# Patient Record
Sex: FEMALE | Race: BLACK OR AFRICAN AMERICAN | Employment: FULL TIME | URBAN - METROPOLITAN AREA
[De-identification: names, ages, dates, MRNs, and addresses within clinical notes are randomized per-mention and may not be internally consistent; named-entity substitution may affect disease eponyms.]

---

## 2021-08-23 ENCOUNTER — CLINICAL SUPPORT (OUTPATIENT)
Dept: URGENT CARE | Facility: CLINIC | Age: 41
End: 2021-08-23
Payer: COMMERCIAL

## 2021-08-23 DIAGNOSIS — Z20.822 EXPOSURE TO COVID-19 VIRUS: Primary | ICD-10-CM

## 2021-08-23 LAB
CTP QC/QA: YES
SARS-COV-2 RDRP RESP QL NAA+PROBE: NEGATIVE

## 2021-08-23 PROCEDURE — U0002 COVID-19 LAB TEST NON-CDC: HCPCS | Mod: QW,S$GLB,, | Performed by: EMERGENCY MEDICINE

## 2021-08-23 PROCEDURE — U0002: ICD-10-PCS | Mod: QW,S$GLB,, | Performed by: EMERGENCY MEDICINE

## 2021-09-24 ENCOUNTER — CLINICAL SUPPORT (OUTPATIENT)
Dept: URGENT CARE | Facility: CLINIC | Age: 41
End: 2021-09-24
Payer: COMMERCIAL

## 2021-09-24 DIAGNOSIS — Z11.59 ENCOUNTER FOR SCREENING FOR OTHER VIRAL DISEASES: Primary | ICD-10-CM

## 2021-09-24 LAB
CTP QC/QA: YES
SARS-COV-2 RDRP RESP QL NAA+PROBE: NEGATIVE

## 2021-09-24 PROCEDURE — U0002 COVID-19 LAB TEST NON-CDC: HCPCS | Mod: QW,S$GLB,, | Performed by: FAMILY MEDICINE

## 2021-09-24 PROCEDURE — U0002: ICD-10-PCS | Mod: QW,S$GLB,, | Performed by: FAMILY MEDICINE

## 2021-09-24 PROCEDURE — 99211 OFF/OP EST MAY X REQ PHY/QHP: CPT | Mod: S$GLB,CS,, | Performed by: FAMILY MEDICINE

## 2021-09-24 PROCEDURE — 99211 PR OFFICE/OUTPT VISIT, EST, LEVL I: ICD-10-PCS | Mod: S$GLB,CS,, | Performed by: FAMILY MEDICINE

## 2021-10-18 ENCOUNTER — CLINICAL SUPPORT (OUTPATIENT)
Dept: URGENT CARE | Facility: CLINIC | Age: 41
End: 2021-10-18
Payer: COMMERCIAL

## 2021-10-18 DIAGNOSIS — Z20.822 EXPOSURE TO COVID-19 VIRUS: Primary | ICD-10-CM

## 2021-10-18 LAB
CTP QC/QA: YES
SARS-COV-2 RDRP RESP QL NAA+PROBE: NEGATIVE

## 2021-10-18 PROCEDURE — U0002: ICD-10-PCS | Mod: QW,S$GLB,, | Performed by: EMERGENCY MEDICINE

## 2021-10-18 PROCEDURE — U0002 COVID-19 LAB TEST NON-CDC: HCPCS | Mod: QW,S$GLB,, | Performed by: EMERGENCY MEDICINE

## 2021-10-25 ENCOUNTER — CLINICAL SUPPORT (OUTPATIENT)
Dept: URGENT CARE | Facility: CLINIC | Age: 41
End: 2021-10-25
Payer: COMMERCIAL

## 2021-10-25 DIAGNOSIS — Z11.52 ENCOUNTER FOR SCREENING FOR COVID-19: Primary | ICD-10-CM

## 2021-10-25 LAB
CTP QC/QA: YES
SARS-COV-2 RDRP RESP QL NAA+PROBE: NEGATIVE

## 2021-10-25 PROCEDURE — U0002 COVID-19 LAB TEST NON-CDC: HCPCS | Mod: QW,S$GLB,, | Performed by: FAMILY MEDICINE

## 2021-10-25 PROCEDURE — U0002: ICD-10-PCS | Mod: QW,S$GLB,, | Performed by: FAMILY MEDICINE

## 2021-11-10 ENCOUNTER — CLINICAL SUPPORT (OUTPATIENT)
Dept: URGENT CARE | Facility: CLINIC | Age: 41
End: 2021-11-10
Payer: COMMERCIAL

## 2021-11-10 DIAGNOSIS — Z13.9 ENCOUNTER FOR SCREENING: Primary | ICD-10-CM

## 2021-11-10 LAB
CTP QC/QA: YES
SARS-COV-2 RDRP RESP QL NAA+PROBE: NEGATIVE

## 2021-11-10 PROCEDURE — U0002 COVID-19 LAB TEST NON-CDC: HCPCS | Mod: QW,S$GLB,, | Performed by: NURSE PRACTITIONER

## 2021-11-10 PROCEDURE — U0002: ICD-10-PCS | Mod: QW,S$GLB,, | Performed by: NURSE PRACTITIONER

## 2021-11-29 ENCOUNTER — CLINICAL SUPPORT (OUTPATIENT)
Dept: URGENT CARE | Facility: CLINIC | Age: 41
End: 2021-11-29
Payer: COMMERCIAL

## 2021-11-29 DIAGNOSIS — Z11.52 ENCOUNTER FOR SCREENING FOR COVID-19: Primary | ICD-10-CM

## 2021-11-29 LAB
CTP QC/QA: YES
SARS-COV-2 RDRP RESP QL NAA+PROBE: NEGATIVE

## 2021-11-29 PROCEDURE — U0002: ICD-10-PCS | Mod: QW,S$GLB,, | Performed by: FAMILY MEDICINE

## 2021-11-29 PROCEDURE — U0002 COVID-19 LAB TEST NON-CDC: HCPCS | Mod: QW,S$GLB,, | Performed by: FAMILY MEDICINE

## 2021-12-06 ENCOUNTER — CLINICAL SUPPORT (OUTPATIENT)
Dept: URGENT CARE | Facility: CLINIC | Age: 41
End: 2021-12-06
Payer: COMMERCIAL

## 2021-12-06 DIAGNOSIS — Z11.52 ENCOUNTER FOR SCREENING FOR COVID-19: Primary | ICD-10-CM

## 2021-12-06 LAB
CTP QC/QA: YES
SARS-COV-2 RDRP RESP QL NAA+PROBE: NEGATIVE

## 2021-12-06 PROCEDURE — U0002: ICD-10-PCS | Mod: QW,S$GLB,, | Performed by: EMERGENCY MEDICINE

## 2021-12-06 PROCEDURE — U0002 COVID-19 LAB TEST NON-CDC: HCPCS | Mod: QW,S$GLB,, | Performed by: EMERGENCY MEDICINE

## 2021-12-16 ENCOUNTER — OFFICE VISIT (OUTPATIENT)
Dept: URGENT CARE | Facility: CLINIC | Age: 41
End: 2021-12-16
Payer: COMMERCIAL

## 2021-12-16 VITALS
SYSTOLIC BLOOD PRESSURE: 119 MMHG | WEIGHT: 200 LBS | DIASTOLIC BLOOD PRESSURE: 79 MMHG | TEMPERATURE: 97 F | HEIGHT: 62 IN | RESPIRATION RATE: 18 BRPM | OXYGEN SATURATION: 98 % | HEART RATE: 83 BPM | BODY MASS INDEX: 36.8 KG/M2

## 2021-12-16 DIAGNOSIS — J06.9 ACUTE URI: ICD-10-CM

## 2021-12-16 DIAGNOSIS — J30.9 ALLERGIC RHINITIS, UNSPECIFIED SEASONALITY, UNSPECIFIED TRIGGER: ICD-10-CM

## 2021-12-16 DIAGNOSIS — R05.9 COUGH: Primary | ICD-10-CM

## 2021-12-16 LAB
CTP QC/QA: YES
SARS-COV-2 RDRP RESP QL NAA+PROBE: NEGATIVE

## 2021-12-16 PROCEDURE — U0002 COVID-19 LAB TEST NON-CDC: HCPCS | Mod: QW,S$GLB,, | Performed by: FAMILY MEDICINE

## 2021-12-16 PROCEDURE — 99213 OFFICE O/P EST LOW 20 MIN: CPT | Mod: S$GLB,,, | Performed by: FAMILY MEDICINE

## 2021-12-16 PROCEDURE — U0002: ICD-10-PCS | Mod: QW,S$GLB,, | Performed by: FAMILY MEDICINE

## 2021-12-16 PROCEDURE — 99213 PR OFFICE/OUTPT VISIT, EST, LEVL III, 20-29 MIN: ICD-10-PCS | Mod: S$GLB,,, | Performed by: FAMILY MEDICINE

## 2021-12-16 RX ORDER — LORATADINE 10 MG/1
10 TABLET ORAL DAILY
Qty: 30 TABLET | Refills: 0 | Status: SHIPPED | OUTPATIENT
Start: 2021-12-16 | End: 2023-03-27

## 2021-12-23 ENCOUNTER — OFFICE VISIT (OUTPATIENT)
Dept: URGENT CARE | Facility: CLINIC | Age: 41
End: 2021-12-23
Payer: COMMERCIAL

## 2021-12-23 VITALS
WEIGHT: 200 LBS | TEMPERATURE: 99 F | HEIGHT: 62 IN | RESPIRATION RATE: 18 BRPM | DIASTOLIC BLOOD PRESSURE: 75 MMHG | OXYGEN SATURATION: 96 % | SYSTOLIC BLOOD PRESSURE: 114 MMHG | HEART RATE: 74 BPM | BODY MASS INDEX: 36.8 KG/M2

## 2021-12-23 DIAGNOSIS — U07.1 COVID-19: Primary | ICD-10-CM

## 2021-12-23 DIAGNOSIS — R07.89 CHEST WALL PAIN: ICD-10-CM

## 2021-12-23 DIAGNOSIS — R05.9 COUGH: ICD-10-CM

## 2021-12-23 LAB
CTP QC/QA: YES
SARS-COV-2 RDRP RESP QL NAA+PROBE: POSITIVE

## 2021-12-23 PROCEDURE — 1160F RVW MEDS BY RX/DR IN RCRD: CPT | Mod: CPTII,S$GLB,,

## 2021-12-23 PROCEDURE — 99213 PR OFFICE/OUTPT VISIT, EST, LEVL III, 20-29 MIN: ICD-10-PCS | Mod: S$GLB,,,

## 2021-12-23 PROCEDURE — 3008F BODY MASS INDEX DOCD: CPT | Mod: CPTII,S$GLB,,

## 2021-12-23 PROCEDURE — 99213 OFFICE O/P EST LOW 20 MIN: CPT | Mod: S$GLB,,,

## 2021-12-23 PROCEDURE — 3008F PR BODY MASS INDEX (BMI) DOCUMENTED: ICD-10-PCS | Mod: CPTII,S$GLB,,

## 2021-12-23 PROCEDURE — U0002 COVID-19 LAB TEST NON-CDC: HCPCS | Mod: QW,S$GLB,,

## 2021-12-23 PROCEDURE — 3078F DIAST BP <80 MM HG: CPT | Mod: CPTII,S$GLB,,

## 2021-12-23 PROCEDURE — 3074F PR MOST RECENT SYSTOLIC BLOOD PRESSURE < 130 MM HG: ICD-10-PCS | Mod: CPTII,S$GLB,,

## 2021-12-23 PROCEDURE — 1159F MED LIST DOCD IN RCRD: CPT | Mod: CPTII,S$GLB,,

## 2021-12-23 PROCEDURE — 1160F PR REVIEW ALL MEDS BY PRESCRIBER/CLIN PHARMACIST DOCUMENTED: ICD-10-PCS | Mod: CPTII,S$GLB,,

## 2021-12-23 PROCEDURE — 71046 X-RAY EXAM CHEST 2 VIEWS: CPT | Mod: S$GLB,,, | Performed by: RADIOLOGY

## 2021-12-23 PROCEDURE — 3078F PR MOST RECENT DIASTOLIC BLOOD PRESSURE < 80 MM HG: ICD-10-PCS | Mod: CPTII,S$GLB,,

## 2021-12-23 PROCEDURE — 71046 XR CHEST PA AND LATERAL: ICD-10-PCS | Mod: S$GLB,,, | Performed by: RADIOLOGY

## 2021-12-23 PROCEDURE — 1159F PR MEDICATION LIST DOCUMENTED IN MEDICAL RECORD: ICD-10-PCS | Mod: CPTII,S$GLB,,

## 2021-12-23 PROCEDURE — 3074F SYST BP LT 130 MM HG: CPT | Mod: CPTII,S$GLB,,

## 2021-12-23 PROCEDURE — U0002: ICD-10-PCS | Mod: QW,S$GLB,,

## 2021-12-23 RX ORDER — BENZONATATE 100 MG/1
100 CAPSULE ORAL 3 TIMES DAILY PRN
Qty: 30 CAPSULE | Refills: 0 | Status: SHIPPED | OUTPATIENT
Start: 2021-12-23 | End: 2022-01-02

## 2021-12-23 RX ORDER — NAPROXEN 500 MG/1
500 TABLET ORAL 2 TIMES DAILY
Qty: 10 TABLET | Refills: 0 | Status: SHIPPED | OUTPATIENT
Start: 2021-12-23 | End: 2021-12-28

## 2022-01-04 ENCOUNTER — NURSE TRIAGE (OUTPATIENT)
Dept: ADMINISTRATIVE | Facility: CLINIC | Age: 42
End: 2022-01-04
Payer: COMMERCIAL

## 2022-01-04 NOTE — TELEPHONE ENCOUNTER
Pt escalated in home monitoring queue. Pt states she initially was coughing up some blood tinged sputum but then twice she coughed up about a tablespoon of blood. Pt is also having some CP even without coughing. Denies SOB at rest. Advised pt she needs to go to ED now for evaluation. Pt verbalized understanding. Gave number to OOC for further concerns.    Reason for Disposition   Chest pain present when not coughing    Additional Information   Negative: Bluish (or gray) lips or face   Negative: SEVERE difficulty breathing (e.g., struggling for each breath, speaks in single words)   Negative: Rapid onset of cough and has hives   Negative: Coughing started suddenly after medicine, an allergic food or bee sting   Negative: Difficulty breathing after exposure to flames, smoke, or fumes   Negative: Sounds like a life-threatening emergency to the triager   Negative: MODERATE difficulty breathing (e.g., speaks in phrases, SOB even at rest, pulse 100-120) and still present when not coughing    Protocols used: COUGH-A-OH

## 2022-01-11 ENCOUNTER — HOSPITAL ENCOUNTER (OUTPATIENT)
Facility: HOSPITAL | Age: 42
Discharge: HOME | End: 2022-01-12
Attending: EMERGENCY MEDICINE | Admitting: HOSPITALIST
Payer: COMMERCIAL

## 2022-01-11 DIAGNOSIS — R07.9 LEFT-SIDED CHEST PAIN: ICD-10-CM

## 2022-01-11 DIAGNOSIS — I26.99 BILATERAL PULMONARY EMBOLISM: Primary | ICD-10-CM

## 2022-01-11 DIAGNOSIS — R06.02 SOB (SHORTNESS OF BREATH): ICD-10-CM

## 2022-01-11 DIAGNOSIS — K59.00 CONSTIPATION: ICD-10-CM

## 2022-01-11 DIAGNOSIS — R07.9 CHEST PAIN: ICD-10-CM

## 2022-01-11 PROBLEM — D72.829 LEUKOCYTOSIS: Status: ACTIVE | Noted: 2022-01-11

## 2022-01-11 PROBLEM — J18.9 MULTIFOCAL PNEUMONIA: Status: ACTIVE | Noted: 2022-01-11

## 2022-01-11 LAB
ALBUMIN SERPL BCP-MCNC: 3.9 G/DL (ref 3.5–5.2)
ALP SERPL-CCNC: 72 U/L (ref 55–135)
ALT SERPL W/O P-5'-P-CCNC: 16 U/L (ref 10–44)
ANION GAP SERPL CALC-SCNC: 11 MMOL/L (ref 8–16)
AST SERPL-CCNC: 12 U/L (ref 10–40)
B-HCG UR QL: NEGATIVE
BACTERIA #/AREA URNS HPF: NORMAL /HPF
BASOPHILS # BLD AUTO: 0.04 K/UL (ref 0–0.2)
BASOPHILS NFR BLD: 0.2 % (ref 0–1.9)
BILIRUB SERPL-MCNC: 1.1 MG/DL (ref 0.1–1)
BILIRUB UR QL STRIP: NEGATIVE
BUN SERPL-MCNC: 10 MG/DL (ref 6–20)
CALCIUM SERPL-MCNC: 9.3 MG/DL (ref 8.7–10.5)
CHLORIDE SERPL-SCNC: 101 MMOL/L (ref 95–110)
CLARITY UR: CLEAR
CO2 SERPL-SCNC: 24 MMOL/L (ref 23–29)
COLOR UR: YELLOW
CREAT SERPL-MCNC: 0.8 MG/DL (ref 0.5–1.4)
DIFFERENTIAL METHOD: ABNORMAL
EOSINOPHIL # BLD AUTO: 0 K/UL (ref 0–0.5)
EOSINOPHIL NFR BLD: 0.2 % (ref 0–8)
ERYTHROCYTE [DISTWIDTH] IN BLOOD BY AUTOMATED COUNT: 14.3 % (ref 11.5–14.5)
EST. GFR  (AFRICAN AMERICAN): >60 ML/MIN/1.73 M^2
EST. GFR  (NON AFRICAN AMERICAN): >60 ML/MIN/1.73 M^2
GLUCOSE SERPL-MCNC: 110 MG/DL (ref 70–110)
GLUCOSE UR QL STRIP: NEGATIVE
HCT VFR BLD AUTO: 38.4 % (ref 37–48.5)
HGB BLD-MCNC: 13.3 G/DL (ref 12–16)
HGB UR QL STRIP: ABNORMAL
HYALINE CASTS #/AREA URNS LPF: 1 /LPF
IMM GRANULOCYTES # BLD AUTO: 0.09 K/UL (ref 0–0.04)
IMM GRANULOCYTES NFR BLD AUTO: 0.5 % (ref 0–0.5)
KETONES UR QL STRIP: NEGATIVE
LEUKOCYTE ESTERASE UR QL STRIP: NEGATIVE
LYMPHOCYTES # BLD AUTO: 2.1 K/UL (ref 1–4.8)
LYMPHOCYTES NFR BLD: 12.4 % (ref 18–48)
MCH RBC QN AUTO: 27.4 PG (ref 27–31)
MCHC RBC AUTO-ENTMCNC: 34.6 G/DL (ref 32–36)
MCV RBC AUTO: 79 FL (ref 82–98)
MICROSCOPIC COMMENT: NORMAL
MONOCYTES # BLD AUTO: 1.7 K/UL (ref 0.3–1)
MONOCYTES NFR BLD: 10 % (ref 4–15)
NEUTROPHILS # BLD AUTO: 12.7 K/UL (ref 1.8–7.7)
NEUTROPHILS NFR BLD: 76.7 % (ref 38–73)
NITRITE UR QL STRIP: NEGATIVE
NRBC BLD-RTO: 0 /100 WBC
PH UR STRIP: 6 [PH] (ref 5–8)
PLATELET # BLD AUTO: 398 K/UL (ref 150–450)
PMV BLD AUTO: 10.3 FL (ref 9.2–12.9)
POTASSIUM SERPL-SCNC: 4.5 MMOL/L (ref 3.5–5.1)
PROT SERPL-MCNC: 7.9 G/DL (ref 6–8.4)
PROT UR QL STRIP: NEGATIVE
RBC # BLD AUTO: 4.86 M/UL (ref 4–5.4)
RBC #/AREA URNS HPF: 4 /HPF (ref 0–4)
SODIUM SERPL-SCNC: 136 MMOL/L (ref 136–145)
SP GR UR STRIP: 1.01 (ref 1–1.03)
SQUAMOUS #/AREA URNS HPF: 10 /HPF
TROPONIN I SERPL DL<=0.01 NG/ML-MCNC: <0.006 NG/ML (ref 0–0.03)
URN SPEC COLLECT METH UR: ABNORMAL
UROBILINOGEN UR STRIP-ACNC: NEGATIVE EU/DL
WBC # BLD AUTO: 16.56 K/UL (ref 3.9–12.7)
WBC #/AREA URNS HPF: 2 /HPF (ref 0–5)

## 2022-01-11 PROCEDURE — 96375 TX/PRO/DX INJ NEW DRUG ADDON: CPT

## 2022-01-11 PROCEDURE — 81025 URINE PREGNANCY TEST: CPT | Performed by: EMERGENCY MEDICINE

## 2022-01-11 PROCEDURE — 93010 ELECTROCARDIOGRAM REPORT: CPT | Mod: ,,, | Performed by: INTERNAL MEDICINE

## 2022-01-11 PROCEDURE — 93010 EKG 12-LEAD: ICD-10-PCS | Mod: ,,, | Performed by: INTERNAL MEDICINE

## 2022-01-11 PROCEDURE — 96365 THER/PROPH/DIAG IV INF INIT: CPT

## 2022-01-11 PROCEDURE — G0378 HOSPITAL OBSERVATION PER HR: HCPCS

## 2022-01-11 PROCEDURE — 96366 THER/PROPH/DIAG IV INF ADDON: CPT

## 2022-01-11 PROCEDURE — 80053 COMPREHEN METABOLIC PANEL: CPT | Performed by: NURSE PRACTITIONER

## 2022-01-11 PROCEDURE — 63600175 PHARM REV CODE 636 W HCPCS

## 2022-01-11 PROCEDURE — U0003 INFECTIOUS AGENT DETECTION BY NUCLEIC ACID (DNA OR RNA); SEVERE ACUTE RESPIRATORY SYNDROME CORONAVIRUS 2 (SARS-COV-2) (CORONAVIRUS DISEASE [COVID-19]), AMPLIFIED PROBE TECHNIQUE, MAKING USE OF HIGH THROUGHPUT TECHNOLOGIES AS DESCRIBED BY CMS-2020-01-R: HCPCS

## 2022-01-11 PROCEDURE — U0005 INFEC AGEN DETEC AMPLI PROBE: HCPCS

## 2022-01-11 PROCEDURE — 81000 URINALYSIS NONAUTO W/SCOPE: CPT | Performed by: EMERGENCY MEDICINE

## 2022-01-11 PROCEDURE — 84484 ASSAY OF TROPONIN QUANT: CPT | Performed by: NURSE PRACTITIONER

## 2022-01-11 PROCEDURE — 25000003 PHARM REV CODE 250

## 2022-01-11 PROCEDURE — 99285 EMERGENCY DEPT VISIT HI MDM: CPT | Mod: 25

## 2022-01-11 PROCEDURE — 63600175 PHARM REV CODE 636 W HCPCS: Performed by: NURSE PRACTITIONER

## 2022-01-11 PROCEDURE — 96372 THER/PROPH/DIAG INJ SC/IM: CPT | Mod: 59

## 2022-01-11 PROCEDURE — 93005 ELECTROCARDIOGRAM TRACING: CPT

## 2022-01-11 PROCEDURE — 25500020 PHARM REV CODE 255: Performed by: NURSE PRACTITIONER

## 2022-01-11 PROCEDURE — 93010 EKG 12-LEAD: ICD-10-PCS | Mod: 77,,, | Performed by: INTERNAL MEDICINE

## 2022-01-11 PROCEDURE — 93010 ELECTROCARDIOGRAM REPORT: CPT | Mod: 77,,, | Performed by: INTERNAL MEDICINE

## 2022-01-11 PROCEDURE — 85025 COMPLETE CBC W/AUTO DIFF WBC: CPT | Performed by: NURSE PRACTITIONER

## 2022-01-11 RX ORDER — IBUPROFEN 200 MG
24 TABLET ORAL
Status: DISCONTINUED | OUTPATIENT
Start: 2022-01-11 | End: 2022-01-12 | Stop reason: HOSPADM

## 2022-01-11 RX ORDER — IBUPROFEN 600 MG/1
600 TABLET ORAL EVERY 6 HOURS PRN
Status: DISCONTINUED | OUTPATIENT
Start: 2022-01-11 | End: 2022-01-12 | Stop reason: HOSPADM

## 2022-01-11 RX ORDER — TALC
6 POWDER (GRAM) TOPICAL NIGHTLY PRN
Status: DISCONTINUED | OUTPATIENT
Start: 2022-01-11 | End: 2022-01-12 | Stop reason: HOSPADM

## 2022-01-11 RX ORDER — ONDANSETRON 2 MG/ML
4 INJECTION INTRAMUSCULAR; INTRAVENOUS EVERY 8 HOURS PRN
Status: DISCONTINUED | OUTPATIENT
Start: 2022-01-11 | End: 2022-01-12 | Stop reason: HOSPADM

## 2022-01-11 RX ORDER — ACETAMINOPHEN 325 MG/1
650 TABLET ORAL EVERY 4 HOURS PRN
Status: DISCONTINUED | OUTPATIENT
Start: 2022-01-11 | End: 2022-01-12 | Stop reason: HOSPADM

## 2022-01-11 RX ORDER — PROCHLORPERAZINE EDISYLATE 5 MG/ML
5 INJECTION INTRAMUSCULAR; INTRAVENOUS EVERY 6 HOURS PRN
Status: DISCONTINUED | OUTPATIENT
Start: 2022-01-11 | End: 2022-01-12 | Stop reason: HOSPADM

## 2022-01-11 RX ORDER — BENZONATATE 100 MG/1
100 CAPSULE ORAL 3 TIMES DAILY PRN
Status: DISCONTINUED | OUTPATIENT
Start: 2022-01-11 | End: 2022-01-12 | Stop reason: HOSPADM

## 2022-01-11 RX ORDER — MAG HYDROX/ALUMINUM HYD/SIMETH 200-200-20
30 SUSPENSION, ORAL (FINAL DOSE FORM) ORAL 4 TIMES DAILY PRN
Status: DISCONTINUED | OUTPATIENT
Start: 2022-01-11 | End: 2022-01-12 | Stop reason: HOSPADM

## 2022-01-11 RX ORDER — LEVOFLOXACIN 5 MG/ML
750 INJECTION, SOLUTION INTRAVENOUS
Status: DISCONTINUED | OUTPATIENT
Start: 2022-01-11 | End: 2022-01-12

## 2022-01-11 RX ORDER — IBUPROFEN 200 MG
16 TABLET ORAL
Status: DISCONTINUED | OUTPATIENT
Start: 2022-01-11 | End: 2022-01-12 | Stop reason: HOSPADM

## 2022-01-11 RX ORDER — GLUCAGON 1 MG
1 KIT INJECTION
Status: DISCONTINUED | OUTPATIENT
Start: 2022-01-11 | End: 2022-01-12 | Stop reason: HOSPADM

## 2022-01-11 RX ORDER — KETOROLAC TROMETHAMINE 30 MG/ML
15 INJECTION, SOLUTION INTRAMUSCULAR; INTRAVENOUS
Status: COMPLETED | OUTPATIENT
Start: 2022-01-11 | End: 2022-01-11

## 2022-01-11 RX ORDER — POLYETHYLENE GLYCOL 3350 17 G/17G
17 POWDER, FOR SOLUTION ORAL DAILY
Status: DISCONTINUED | OUTPATIENT
Start: 2022-01-11 | End: 2022-01-12 | Stop reason: HOSPADM

## 2022-01-11 RX ORDER — DOCUSATE SODIUM 100 MG/1
100 CAPSULE, LIQUID FILLED ORAL 2 TIMES DAILY
Status: DISCONTINUED | OUTPATIENT
Start: 2022-01-11 | End: 2022-01-12 | Stop reason: HOSPADM

## 2022-01-11 RX ORDER — SODIUM CHLORIDE 0.9 % (FLUSH) 0.9 %
10 SYRINGE (ML) INJECTION EVERY 8 HOURS PRN
Status: DISCONTINUED | OUTPATIENT
Start: 2022-01-11 | End: 2022-01-12 | Stop reason: HOSPADM

## 2022-01-11 RX ORDER — ENOXAPARIN SODIUM 100 MG/ML
1 INJECTION SUBCUTANEOUS EVERY 12 HOURS
Status: DISCONTINUED | OUTPATIENT
Start: 2022-01-11 | End: 2022-01-12 | Stop reason: HOSPADM

## 2022-01-11 RX ORDER — SIMETHICONE 80 MG
1 TABLET,CHEWABLE ORAL 4 TIMES DAILY PRN
Status: DISCONTINUED | OUTPATIENT
Start: 2022-01-11 | End: 2022-01-12 | Stop reason: HOSPADM

## 2022-01-11 RX ADMIN — LEVOFLOXACIN 750 MG: 750 INJECTION, SOLUTION INTRAVENOUS at 08:01

## 2022-01-11 RX ADMIN — IOHEXOL 100 ML: 350 INJECTION, SOLUTION INTRAVENOUS at 02:01

## 2022-01-11 RX ADMIN — DOCUSATE SODIUM 100 MG: 100 CAPSULE, LIQUID FILLED ORAL at 08:01

## 2022-01-11 RX ADMIN — BENZONATATE 100 MG: 100 CAPSULE ORAL at 08:01

## 2022-01-11 RX ADMIN — POLYETHYLENE GLYCOL 3350 17 G: 17 POWDER, FOR SOLUTION ORAL at 09:01

## 2022-01-11 RX ADMIN — ENOXAPARIN SODIUM 90 MG: 100 INJECTION SUBCUTANEOUS at 08:01

## 2022-01-11 RX ADMIN — KETOROLAC TROMETHAMINE 15 MG: 30 INJECTION, SOLUTION INTRAMUSCULAR at 12:01

## 2022-01-11 RX ADMIN — IBUPROFEN 600 MG: 600 TABLET, FILM COATED ORAL at 08:01

## 2022-01-11 NOTE — HPI
Ms. Gayatri Henderson is a 40 y/o female with no known medical history presents to [facility] ED with complaints of chest pain that started last night around 2130 while at home lying on her couch. Patient reports her chest pain has been constant since last night. Patient reports her chest pain is located on her left side, constant, pressure-like, radiates to back, and 10/10 on numeric pain scale. Patient reports her chest pain is worse with coughing and movement. Patient denies taking any medication for her symptoms. She was recently diagnosed with COVID-19 infection on 12/23/21, which patient reports the symptoms had improved. She denies fever, fatigue, shortness of breath, hemoptysis, palpitations, nausea, vomiting, abdominal pain, or leg swelling. She reports she was recently seen by her PCP on 1/4/2022 and was prescribed azithromycin with Decadron x 5 days, which she has completed. She also reports her PCP recently informed her that her D-dimer is elevated (1.17). She denies history of DVTs or HRT. She denies recent travel. She tobacco or illicit drug use. She is not COVID-19 vaccinated. She also complains of constipation. [facility] chest revealed bilateral pulmonary emboli without evidence of right heart strain and numerous small nodular and ground-glass opacities in bilateral lungs; likely sequela of resolving multifocal pneumonia.

## 2022-01-11 NOTE — ED PROVIDER NOTES
Encounter Date: 1/11/2022       History     Chief Complaint   Patient presents with    Chest Pain     Pt c/o cp that began this am. Em reports that pt called 911 for flank pain and constipation.      40yo female presents to the ED for left sided chest pain that started last night at 2130 while lying on the couch.  The patient states that the pain has been constant since that time.  The pain is worse with movement and coughing.  Pt reports that she was diagnosed with COVID at the end of December and states that her symptoms have improved.  She denies fever, abd pain, vomiting, and hemoptysis.  She states that her PCP recently told her that her d-dimer is elevated.  No pmhx of DVT or HRT.  No recent travel.  The patient has tried nothing for her pain.  No pmhx of CAD.  She also reports constipation.  Her last BM was today although it was small.  Pt reports decreased oral intake.  No other complaints at this time.     The history is provided by the patient.     Review of patient's allergies indicates:  No Known Allergies  History reviewed. No pertinent past medical history.  History reviewed. No pertinent surgical history.  History reviewed. No pertinent family history.  Social History     Tobacco Use    Smoking status: Never Smoker    Smokeless tobacco: Never Used     Review of Systems   Constitutional: Positive for activity change and appetite change. Negative for chills, fatigue and fever.   HENT: Negative for ear pain, rhinorrhea and sore throat.    Respiratory: Positive for cough. Negative for shortness of breath.    Cardiovascular: Positive for chest pain. Negative for palpitations and leg swelling.   Gastrointestinal: Positive for constipation. Negative for abdominal pain, diarrhea, nausea and vomiting.   Skin: Negative for rash.   Neurological: Negative for weakness and headaches.   Psychiatric/Behavioral: Negative for confusion.   All other systems reviewed and are negative.      Physical Exam     Initial  Vitals [01/11/22 1023]   BP Pulse Resp Temp SpO2   110/71 81 18 98.2 °F (36.8 °C) 98 %      MAP       --         Physical Exam    Nursing note and vitals reviewed.  Constitutional: Vital signs are normal. She appears well-developed and well-nourished. She is Obese . She is active and cooperative.  Non-toxic appearance. She does not have a sickly appearance. She does not appear ill. No distress.   HENT:   Head: Normocephalic and atraumatic.   Eyes: Conjunctivae and EOM are normal.   Neck: Neck supple.   Normal range of motion.  Cardiovascular: Normal rate and regular rhythm.   Pulmonary/Chest: Effort normal and breath sounds normal. She exhibits tenderness. She exhibits no mass, no bony tenderness, no crepitus, no edema, no swelling and no retraction.     Abdominal: Abdomen is soft and flat. Normal appearance and bowel sounds are normal. There is no abdominal tenderness.   Musculoskeletal:      Cervical back: Normal range of motion and neck supple.      Comments: No LE edema or TTP.      Neurological: She is alert and oriented to person, place, and time. She has normal strength. GCS eye subscore is 4. GCS verbal subscore is 5. GCS motor subscore is 6.   Skin: Skin is warm, dry and intact. No bruising and no rash noted. No erythema. No pallor.   Psychiatric: She has a normal mood and affect. Her speech is normal and behavior is normal. Judgment and thought content normal. Cognition and memory are normal.         ED Course   Procedures  Labs Reviewed   URINALYSIS, REFLEX TO URINE CULTURE - Abnormal; Notable for the following components:       Result Value    Occult Blood UA 2+ (*)     All other components within normal limits    Narrative:     Specimen Source->Urine   CBC W/ AUTO DIFFERENTIAL - Abnormal; Notable for the following components:    WBC 16.56 (*)     MCV 79 (*)     Gran # (ANC) 12.7 (*)     Immature Grans (Abs) 0.09 (*)     Mono # 1.7 (*)     Gran % 76.7 (*)     Lymph % 12.4 (*)     All other components  within normal limits   COMPREHENSIVE METABOLIC PANEL - Abnormal; Notable for the following components:    Total Bilirubin 1.1 (*)     All other components within normal limits   PREGNANCY TEST, URINE RAPID    Narrative:     Specimen Source->Urine   URINALYSIS MICROSCOPIC    Narrative:     Specimen Source->Urine   TROPONIN I        ECG Results          EKG 12-lead (In process)  Result time 01/11/22 15:19:31    In process by Interface, Lab In [facility] (01/11/22 15:19:31)                 Narrative:    Test Reason : R07.9,    Vent. Rate : 084 BPM     Atrial Rate : 084 BPM     P-R Int : 138 ms          QRS Dur : 070 ms      QT Int : 370 ms       P-R-T Axes : 029 046 030 degrees     QTc Int : 437 ms    Normal sinus rhythm  Nonspecific T wave abnormality  Abnormal ECG  No previous ECGs available    Referred By: [email or web address]   SELF           Confirmed By:                             Imaging Results           CTA Chest Non-Coronary (PE Study) (Final result)  Result time 01/11/22 15:05:41    Final result by Huey Garcia DO (01/11/22 15:05:41)                 Impression:      1. Bilateral pulmonary emboli as detailed above.  No evidence of right heart strain.  2. Numerous small nodular and ground-glass opacities in the bilateral lungs, likely sequela of resolving multifocal pneumonia.  Follow-up is recommended at the resolution of the patient's acute symptoms.  This report was flagged in Epic as abnormal.    Dr. Garcia discussed critical findings with PRABHA Dobson by telephone at 15:05 on 01/11/2022.      Electronically signed by: Huey Garcia  Date:    01/11/2022  Time:    15:05             Narrative:    EXAMINATION:  CTA CHEST NON CORONARY    CLINICAL HISTORY:  Pulmonary embolism (PE) suspected, positive D-dimer;    TECHNIQUE:  Low dose axial images, sagittal and coronal reformations were obtained from the thoracic inlet to the lung bases following the IV administration of 100 mL of Omnipaque 350.  Contrast timing was  optimized to evaluate the pulmonary arteries.  Maximum intensity projection images were provided for review.    COMPARISON:  Chest radiograph from 12/23/2021.    FINDINGS:  Pulmonary vasculature: There are bilateral pulmonary emboli.  There is a occlusive thrombus within the left inter lobar pulmonary artery, extending into the lingular branch (series 2, image 138).  There are occlusive pulmonary emboli within left lower lobe segmental branches (series 2, image images 191, 163).  There is occlusive thrombus within the right inter lobar pulmonary artery, extending into a right upper lobe segmental branch (series 2, image 151).    Aorta: Left-sided aortic arch.  No aneurysm and no significant atherosclerosis.    Base of Neck: No significant abnormality.    Thoracic soft tissues: Normal.    Heart: Normal size. No effusion.    Maggie/Mediastinum: No pathologic dorcas enlargement.    Airways: The large airways are patent. No foci of endobronchial filling.    Lungs/Pleura: There are numerous small nodular and ground-glass opacities within the bilateral lungs.  No pleural effusion or thickening.    Esophagus: Normal.    Upper Abdomen: No abnormality of the partially imaged upper abdomen.    Bones: No acute fracture. No suspicious lytic or sclerotic lesions.                               X-Ray Abdomen Flat And Erect (Final result)  Result time 01/11/22 12:07:55    Final result by Emile Castellanos MD (01/11/22 12:07:55)                 Impression:      Nonobstructive bowel gas pattern.      Electronically signed by: Emile Castellanos MD  Date:    01/11/2022  Time:    12:07             Narrative:    EXAMINATION:  XR ABDOMEN FLAT AND ERECT    CLINICAL HISTORY:  Constipation, unspecified    TECHNIQUE:  Flat and erect AP views of the abdomen were performed.    COMPARISON:  Chest radiograph 12/23/2021    FINDINGS:  Mild amount of scattered fecal material throughout the colon.  Otherwise nonobstructive bowel gas pattern.  No organomegaly  or significant mass effect.  No large amount of free air or abnormal intra-abdominal calcification seen.  Imaged lung bases are clear.  Osseous structures appear intact.                                 Medications   enoxaparin injection 1 mg/kg (Dosing Weight) (has no administration in time range)   ketorolac injection 15 mg (15 mg Intravenous Given 1/11/22 1255)   iohexoL (OMNIPAQUE 350) injection 100 mL (100 mLs Intravenous Given 1/11/22 1437)     Medical Decision Making:   History:   Old Medical Records: I decided to obtain old medical records.  Old Records Summarized: other records.       <> Summary of Records: D-dimer 1/4/21 1.17.   Initial Assessment:   42yo female who was recently diagnosed with COVID presents to the ED for constant left-sided CP that started while at rest at 2130 last night. Pain has been constant and is worse with movement and cough.  Pt also reports constipation, last BM today. No vomiting. Pt is well-appearing.  Vitals stable.  Reproducible left lateral chest pain with palpation. No crepitus.   Differential Diagnosis:   Costochondritis, NSTEMI, arrhthymias, PE, pneumonia, pneumothorax, chest wall strain  Clinical Tests:   Lab Tests: Ordered and Reviewed  Radiological Study: Ordered and Reviewed  Medical Tests: Ordered and Reviewed  ED Management:  1320: Pt reports pain has improved after toradol.  VSS.     2:10 PM  Awaiting CTA.     Pt has bilateral PEs. No evidence of right heart strain.  Troponin negative.  I discussed the findings with , cardiology, who states that the PEs are small and in the segmental branches.  There will likely not be intervention. He advised lovenox and admit to .  He will review the CTA.     Discussed with [facility]  NP who agrees to accept the patient.  Pt is agreeable to the plan.   Other:   I have discussed this case with another health care provider.       <> Summary of the Discussion: Reviewed with  who agrees with ED course and  disposition.                       Clinical Impression:   Final diagnoses:  [R07.9] Chest pain  [K59.00] Constipation  [R07.9] Left-sided chest pain  [I26.99] Bilateral pulmonary embolism (Primary)          ED Disposition Condition    Observation               JAELYN Gamboa  01/11/22 1534

## 2022-01-11 NOTE — ASSESSMENT & PLAN NOTE
-recently tested positive for COVID-19 and was diagnosed with PNA by PCP on 1/4/2022  -Patient completed azithromycin and decadron x 5 day course and failed outpatient therapy  -Levoquin 750mg IV q24hr  -Supplemental Oxygen for SpO2 <92%  -anti-tussives for cough PRN

## 2022-01-11 NOTE — ASSESSMENT & PLAN NOTE
-Patient reported last BM was  -XRay of abdomen revealed mild amount of scattered fecal material throughout the colon and nonobstructive bowel gas pattern   -Will initiate bowel regimen  -Encourage fluid intake and OOB as tolerated

## 2022-01-11 NOTE — ASSESSMENT & PLAN NOTE
-WBC 16.56 on admit  -likely 2/2 to multi-focal PNA seen on CTA chest  -Initiated empiric antibiotic therapy with Levoquin 750mg IV q24hr  -Monitor and trend CBC daily  -Monitor and trend vital signs q4hr

## 2022-01-11 NOTE — ASSESSMENT & PLAN NOTE
Chest pain on exertion    -Presents with left chest pain worse with coughing and movement  -Initial troponin negative (<0.006)  -CTA chest revealed bilateral pulmonary emboli without evidence of right heart strain   -Started on Lovenox 1mg/kg q12hr  -Consulted Cardiology; appreciate recs; plan is to transition patient to OAC  -Continuous cardiac monitoring  -Anti-tussives for cough PRN  -Anagesics for pain management  -Supplemental oxygen for SpO2 <90%

## 2022-01-11 NOTE — Clinical Note
"Gayatri"Daphne Henderson was seen and treated in our emergency department on 1/11/2022.  She may return to work on 01/26/2022.  May return sooner if feeling better.     If you have any questions or concerns, please don't hesitate to call.      miladys breaux RN    "

## 2022-01-12 VITALS
DIASTOLIC BLOOD PRESSURE: 55 MMHG | OXYGEN SATURATION: 96 % | WEIGHT: 208 LBS | BODY MASS INDEX: 38.28 KG/M2 | HEART RATE: 95 BPM | TEMPERATURE: 98 F | SYSTOLIC BLOOD PRESSURE: 124 MMHG | RESPIRATION RATE: 42 BRPM | HEIGHT: 62 IN

## 2022-01-12 LAB
ALBUMIN SERPL BCP-MCNC: 3.3 G/DL (ref 3.5–5.2)
ALP SERPL-CCNC: 67 U/L (ref 55–135)
ALT SERPL W/O P-5'-P-CCNC: 11 U/L (ref 10–44)
ANION GAP SERPL CALC-SCNC: 9 MMOL/L (ref 8–16)
AORTIC ROOT ANNULUS: 2.58 CM
ASCENDING AORTA: 2.63 CM
AST SERPL-CCNC: 12 U/L (ref 10–40)
AV INDEX (PROSTH): 0.81
AV MEAN GRADIENT: 3 MMHG
AV PEAK GRADIENT: 4 MMHG
AV VALVE AREA: 2.44 CM2
AV VELOCITY RATIO: 0.77
BASOPHILS # BLD AUTO: 0.03 K/UL (ref 0–0.2)
BASOPHILS NFR BLD: 0.3 % (ref 0–1.9)
BILIRUB SERPL-MCNC: 1.2 MG/DL (ref 0.1–1)
BSA FOR ECHO PROCEDURE: 2.03 M2
BUN SERPL-MCNC: 15 MG/DL (ref 6–20)
CALCIUM SERPL-MCNC: 8.8 MG/DL (ref 8.7–10.5)
CHLORIDE SERPL-SCNC: 102 MMOL/L (ref 95–110)
CO2 SERPL-SCNC: 26 MMOL/L (ref 23–29)
CREAT SERPL-MCNC: 0.8 MG/DL (ref 0.5–1.4)
CV ECHO LV RWT: 0.42 CM
DIFFERENTIAL METHOD: ABNORMAL
DOP CALC AO PEAK VEL: 0.99 M/S
DOP CALC AO VTI: 18.88 CM
DOP CALC LVOT AREA: 3 CM2
DOP CALC LVOT DIAMETER: 1.96 CM
DOP CALC LVOT PEAK VEL: 0.76 M/S
DOP CALC LVOT STROKE VOLUME: 46.02 CM3
DOP CALCLVOT PEAK VEL VTI: 15.26 CM
E WAVE DECELERATION TIME: 165.02 MSEC
E/A RATIO: 1.85
E/E' RATIO: 9 M/S
ECHO LV POSTERIOR WALL: 0.78 CM (ref 0.6–1.1)
EJECTION FRACTION: 65 %
EOSINOPHIL # BLD AUTO: 0.2 K/UL (ref 0–0.5)
EOSINOPHIL NFR BLD: 1.5 % (ref 0–8)
ERYTHROCYTE [DISTWIDTH] IN BLOOD BY AUTOMATED COUNT: 14.6 % (ref 11.5–14.5)
EST. GFR  (AFRICAN AMERICAN): >60 ML/MIN/1.73 M^2
EST. GFR  (NON AFRICAN AMERICAN): >60 ML/MIN/1.73 M^2
FRACTIONAL SHORTENING: 35 % (ref 28–44)
GLUCOSE SERPL-MCNC: 92 MG/DL (ref 70–110)
HCT VFR BLD AUTO: 33.2 % (ref 37–48.5)
HGB BLD-MCNC: 11.3 G/DL (ref 12–16)
IMM GRANULOCYTES # BLD AUTO: 0.05 K/UL (ref 0–0.04)
IMM GRANULOCYTES NFR BLD AUTO: 0.4 % (ref 0–0.5)
INTERVENTRICULAR SEPTUM: 0.72 CM (ref 0.6–1.1)
IVRT: 122.74 MSEC
LA MAJOR: 4.52 CM
LA MINOR: 4.13 CM
LA WIDTH: 3.8 CM
LEFT ATRIUM SIZE: 3.23 CM
LEFT ATRIUM VOLUME INDEX MOD: 19.7 ML/M2
LEFT ATRIUM VOLUME INDEX: 23.2 ML/M2
LEFT ATRIUM VOLUME MOD: 38.24 CM3
LEFT ATRIUM VOLUME: 45.03 CM3
LEFT INTERNAL DIMENSION IN SYSTOLE: 2.41 CM (ref 2.1–4)
LEFT VENTRICLE DIASTOLIC VOLUME INDEX: 29.69 ML/M2
LEFT VENTRICLE DIASTOLIC VOLUME: 57.59 ML
LEFT VENTRICLE MASS INDEX: 39 G/M2
LEFT VENTRICLE SYSTOLIC VOLUME INDEX: 10.5 ML/M2
LEFT VENTRICLE SYSTOLIC VOLUME: 20.33 ML
LEFT VENTRICULAR INTERNAL DIMENSION IN DIASTOLE: 3.69 CM (ref 3.5–6)
LEFT VENTRICULAR MASS: 75.11 G
LV LATERAL E/E' RATIO: 9 M/S
LV SEPTAL E/E' RATIO: 9 M/S
LYMPHOCYTES # BLD AUTO: 3.2 K/UL (ref 1–4.8)
LYMPHOCYTES NFR BLD: 28.2 % (ref 18–48)
MAGNESIUM SERPL-MCNC: 1.8 MG/DL (ref 1.6–2.6)
MCH RBC QN AUTO: 27.1 PG (ref 27–31)
MCHC RBC AUTO-ENTMCNC: 34 G/DL (ref 32–36)
MCV RBC AUTO: 80 FL (ref 82–98)
MONOCYTES # BLD AUTO: 1.4 K/UL (ref 0.3–1)
MONOCYTES NFR BLD: 12.8 % (ref 4–15)
MV PEAK A VEL: 0.39 M/S
MV PEAK E VEL: 0.72 M/S
MV STENOSIS PRESSURE HALF TIME: 47.86 MS
MV VALVE AREA P 1/2 METHOD: 4.6 CM2
NEUTROPHILS # BLD AUTO: 6.4 K/UL (ref 1.8–7.7)
NEUTROPHILS NFR BLD: 56.8 % (ref 38–73)
NRBC BLD-RTO: 0 /100 WBC
PHOSPHATE SERPL-MCNC: 3.9 MG/DL (ref 2.7–4.5)
PLATELET # BLD AUTO: 346 K/UL (ref 150–450)
PMV BLD AUTO: 10.6 FL (ref 9.2–12.9)
POTASSIUM SERPL-SCNC: 3.9 MMOL/L (ref 3.5–5.1)
PROT SERPL-MCNC: 7 G/DL (ref 6–8.4)
PULM VEIN S/D RATIO: 1.13
PV PEAK D VEL: 0.24 M/S
PV PEAK S VEL: 0.27 M/S
RA MAJOR: 3.69 CM
RA PRESSURE: 3 MMHG
RA WIDTH: 2.82 CM
RBC # BLD AUTO: 4.17 M/UL (ref 4–5.4)
RIGHT VENTRICULAR END-DIASTOLIC DIMENSION: 2.36 CM
RV TISSUE DOPPLER FREE WALL SYSTOLIC VELOCITY 1 (APICAL 4 CHAMBER VIEW): 9.22 CM/S
SARS-COV-2 RNA RESP QL NAA+PROBE: DETECTED
SARS-COV-2- CYCLE NUMBER: 39
SODIUM SERPL-SCNC: 137 MMOL/L (ref 136–145)
STJ: 2.58 CM
TDI LATERAL: 0.08 M/S
TDI SEPTAL: 0.08 M/S
TDI: 0.08 M/S
WBC # BLD AUTO: 11.27 K/UL (ref 3.9–12.7)

## 2022-01-12 PROCEDURE — 63600175 PHARM REV CODE 636 W HCPCS: Performed by: NURSE PRACTITIONER

## 2022-01-12 PROCEDURE — G0378 HOSPITAL OBSERVATION PER HR: HCPCS

## 2022-01-12 PROCEDURE — 83735 ASSAY OF MAGNESIUM: CPT

## 2022-01-12 PROCEDURE — 85025 COMPLETE CBC W/AUTO DIFF WBC: CPT

## 2022-01-12 PROCEDURE — 99220 PR INITIAL OBSERVATION CARE,LEVL III: CPT | Mod: ,,, | Performed by: NURSE PRACTITIONER

## 2022-01-12 PROCEDURE — 96372 THER/PROPH/DIAG INJ SC/IM: CPT | Mod: 59

## 2022-01-12 PROCEDURE — 84100 ASSAY OF PHOSPHORUS: CPT

## 2022-01-12 PROCEDURE — 25000003 PHARM REV CODE 250

## 2022-01-12 PROCEDURE — 96376 TX/PRO/DX INJ SAME DRUG ADON: CPT

## 2022-01-12 PROCEDURE — 99220 PR INITIAL OBSERVATION CARE,LEVL III: ICD-10-PCS | Mod: ,,, | Performed by: NURSE PRACTITIONER

## 2022-01-12 PROCEDURE — 80053 COMPREHEN METABOLIC PANEL: CPT

## 2022-01-12 RX ORDER — KETOROLAC TROMETHAMINE 30 MG/ML
30 INJECTION, SOLUTION INTRAMUSCULAR; INTRAVENOUS ONCE
Status: COMPLETED | OUTPATIENT
Start: 2022-01-12 | End: 2022-01-12

## 2022-01-12 RX ADMIN — ENOXAPARIN SODIUM 90 MG: 100 INJECTION SUBCUTANEOUS at 11:01

## 2022-01-12 RX ADMIN — KETOROLAC TROMETHAMINE 30 MG: 30 INJECTION, SOLUTION INTRAMUSCULAR; INTRAVENOUS at 03:01

## 2022-01-12 RX ADMIN — BENZONATATE 100 MG: 100 CAPSULE ORAL at 11:01

## 2022-01-12 RX ADMIN — ACETAMINOPHEN 650 MG: 325 TABLET ORAL at 11:01

## 2022-01-12 RX ADMIN — IBUPROFEN 600 MG: 600 TABLET, FILM COATED ORAL at 11:01

## 2022-01-12 RX ADMIN — POLYETHYLENE GLYCOL 3350 17 G: 17 POWDER, FOR SOLUTION ORAL at 11:01

## 2022-01-12 NOTE — ASSESSMENT & PLAN NOTE
- presented with chest pain and SOB with elevated D dimer and recent COVID infection  - CTA with bilateral PEs; BLE venous ultrasound with no evidence of DVT  - placed on SQ Lovenox; Cardiology consulted  - BNP normal and troponin negative and on room air; CT scan reviewed per Dr. Forman  - recommend Lovenox with transition to DOAC with either Xarelto or Eliquis- no need for thrombectomy

## 2022-01-12 NOTE — H&P
[facility] Medicine  History & Physical    Patient Name: Gayatri Henderson  MRN: [id number]  Patient Class: OP- Observation  Admission Date: 1/11/2022  Attending Physician: Roland Andino, *   Primary Care Provider: Primary Doctor No         Patient information was obtained from patient and ER records.     Subjective:     Principal Problem:Bilateral pulmonary embolism    Chief Complaint:   Chief Complaint   Patient presents with    Chest Pain     Pt c/o cp that began this am. Em reports that pt called 911 for flank pain and constipation.         HPI: Ms. Gayatri Henderson is a 42 y/o female with no known medical history presents to [facility] ED with complaints of chest pain that started last night around 2130 while at home lying on her couch. Patient reports her chest pain has been constant since last night. Patient reports her chest pain is located on her left side, constant, pressure-like, radiates to back, and 10/10 on numeric pain scale. Patient reports her chest pain is worse with coughing and movement. Patient denies taking any medication for her symptoms. She was recently diagnosed with COVID-19 infection on 12/23/21, which patient reports the symptoms had improved. She denies fever, fatigue, shortness of breath, hemoptysis, palpitations, nausea, vomiting, abdominal pain, or leg swelling. She reports she was recently seen by her PCP on 1/4/2022 and was prescribed azithromycin with Decadron x 5 days, which she has completed. She also reports her PCP recently informed her that her D-dimer is elevated (1.17). She denies history of DVTs or HRT. She denies recent travel. She tobacco or illicit drug use. She is not COVID-19 vaccinated. She also complains of constipation. [facility]: CTA chest revealed bilateral pulmonary emboli without evidence of right heart strain and numerous small nodular and ground-glass opacities in bilateral lungs; likely sequela of  resolving multifocal pneumonia.       History reviewed. No pertinent past medical history.    History reviewed. No pertinent surgical history.    Review of patient's allergies indicates:  No Known Allergies    No current facility-administered medications on file prior to encounter.     Current Outpatient Medications on File Prior to Encounter   Medication Sig    loratadine (CLARITIN) 10 mg tablet Take 1 tablet (10 mg total) by mouth once daily.     Family History    None       Tobacco Use    Smoking status: Never Smoker    Smokeless tobacco: Never Used   Substance and Sexual Activity    Alcohol use: Not on file    Drug use: Not on file    Sexual activity: Not on file     Review of Systems   Constitutional: Negative for chills, diaphoresis and fever.   HENT: Negative for hearing loss and sore throat.    Eyes: Negative for visual disturbance.   Respiratory: Positive for cough. Negative for shortness of breath and wheezing.    Cardiovascular: Positive for chest pain. Negative for leg swelling.   Gastrointestinal: Negative for abdominal pain, diarrhea, nausea and vomiting.   Genitourinary: Negative for difficulty urinating, dysuria and flank pain.   Musculoskeletal: Positive for back pain. Negative for neck pain.   Skin: Negative for rash and wound.   Neurological: Negative for dizziness, weakness and headaches.   Psychiatric/Behavioral: Negative for agitation, confusion and hallucinations.     Objective:     Vital Signs (Most Recent):  Temp: 98.3 °F (36.8 °C) (01/11/22 1648)  Pulse: 91 (01/11/22 1648)  Resp: (!) 23 (01/11/22 1648)  BP: 122/75 (01/11/22 1648)  SpO2: 100 % (01/11/22 1648) Vital Signs (24h Range):  Temp:  [98.2 °F (36.8 °C)-98.3 °F (36.8 °C)] 98.3 °F (36.8 °C)  Pulse:  [81-91] 91  Resp:  [18-27] 23  SpO2:  [98 %-100 %] 100 %  BP: (110-150)/(66-75) 122/75     Weight: 94.3 kg (208 lb)  Body mass index is 38.04 kg/m².    Physical Exam  Vitals and nursing note reviewed.   Constitutional:       General:  She is not in acute distress.     Appearance: Normal appearance. She is not ill-appearing.   HENT:      Head: Normocephalic and atraumatic.      Mouth/Throat:      Mouth: Mucous membranes are moist.   Eyes:      Extraocular Movements: Extraocular movements intact.      Pupils: Pupils are equal, round, and reactive to light.   Cardiovascular:      Rate and Rhythm: Normal rate and regular rhythm.      Pulses: Normal pulses.      Heart sounds: Normal heart sounds. No murmur heard.  No friction rub. No gallop.    Pulmonary:      Effort: Pulmonary effort is normal. No respiratory distress.      Breath sounds: Examination of the right-middle field reveals rhonchi. Examination of the left-middle field reveals rhonchi. Examination of the right-lower field reveals decreased breath sounds. Examination of the left-lower field reveals decreased breath sounds. Decreased breath sounds and rhonchi present. No wheezing.   Abdominal:      General: Bowel sounds are normal. There is no distension.      Palpations: Abdomen is soft.      Tenderness: There is no abdominal tenderness. There is no guarding.   Musculoskeletal:         General: No swelling.      Cervical back: Normal range of motion and neck supple.      Right lower leg: No edema.      Left lower leg: No edema.   Skin:     General: Skin is warm and dry.      Capillary Refill: Capillary refill takes less than 2 seconds.      Findings: No bruising, erythema or rash.   Neurological:      General: No focal deficit present.      Mental Status: She is alert and oriented to person, place, and time.   Psychiatric:         Mood and Affect: Mood normal.         Behavior: Behavior normal. Behavior is cooperative.           CRANIAL NERVES     CN III, IV, VI   Pupils are equal, round, and reactive to light.       Significant Labs:   All pertinent labs within the past 24 hours have been reviewed.  Bilirubin:   Recent Labs   Lab 01/11/22  1153   BILITOT 1.1*     BMP:   Recent Labs   Lab  01/11/22  1153         K 4.5      CO2 24   BUN 10   CREATININE 0.8   CALCIUM 9.3     CBC:   Recent Labs   Lab 01/11/22  1153   WBC 16.56*   HGB 13.3   HCT 38.4        CMP:   Recent Labs   Lab 01/11/22  1153      K 4.5      CO2 24      BUN 10   CREATININE 0.8   CALCIUM 9.3   PROT 7.9   ALBUMIN 3.9   BILITOT 1.1*   ALKPHOS 72   AST 12   ALT 16   ANIONGAP 11   EGFRNONAA >60         Troponin:   Recent Labs   Lab 01/11/22  1153   TROPONINI <0.006     Urine Studies:   Recent Labs   Lab 01/11/22  1039   COLORU Yellow   APPEARANCEUA Clear   PHUR 6.0   SPECGRAV 1.015   PROTEINUA Negative   GLUCUA Negative   KETONESU Negative   BILIRUBINUA Negative   OCCULTUA 2+*   NITRITE Negative   UROBILINOGEN Negative   LEUKOCYTESUR Negative   RBCUA 4   WBCUA 2   BACTERIA Occasional   SQUAMEPITHEL 10   HYALINECASTS 1       Significant Imaging: I have reviewed all pertinent imaging results/findings within the past 24 hours.    Assessment/Plan:     * Bilateral pulmonary embolism  Chest pain on exertion    -Presents with left chest pain worse with coughing and movement  -Initial troponin negative (<0.006)  -CTA chest revealed bilateral pulmonary emboli without evidence of right heart strain   -Started on Lovenox 1mg/kg q12hr  -Consulted Cardiology; appreciate recs; plan is to transition patient to OAC  -Continuous cardiac monitoring  -Anti-tussives for cough PRN  -Anagesics for pain management  -Supplemental oxygen for SpO2 <90%      Leukocytosis  -WBC 16.56 on admit  -likely 2/2 to multi-focal PNA seen on CTA chest  -Initiated empiric antibiotic therapy with Levoquin 750mg IV q24hr  -Monitor and trend CBC daily  -Monitor and trend vital signs q4hr      Multifocal pneumonia  -recently tested positive for COVID-19 and was diagnosed with PNA by PCP on 1/4/2022  -Patient completed azithromycin and decadron x 5 day course and failed outpatient therapy  -Levoquin 750mg IV q24hr  -Supplemental Oxygen  for SpO2 <92%  -anti-tussives for cough PRN        Constipation  -Patient reported last BM was  -XRay of abdomen revealed mild amount of scattered fecal material throughout the colon and nonobstructive bowel gas pattern   -Will initiate bowel regimen  -Encourage fluid intake and OOB as tolerated      Chest pain on exertion  See above.     VTE Risk Mitigation (From admission, onward)         Ordered     enoxaparin injection 90 mg  Every 12 hours         01/11/22 4622                     eMrcedes Cosby, ZANE, ACNPC-AG, CCRN  Hospitalist  Department of Hospital Medicine  [facility]   [phone]

## 2022-01-12 NOTE — SUBJECTIVE & OBJECTIVE
Interval History: She feels better. On room air. Still has some SOB and chest pain on exertion. BLE negative for DVT.      Review of Systems   Constitutional: Negative for appetite change, chills and fever.   HENT: Negative for congestion.    Eyes: Negative for visual disturbance.   Respiratory: Positive for shortness of breath (on exertion).    Cardiovascular: Positive for chest pain (with deep breath). Negative for leg swelling.   Gastrointestinal: Negative for abdominal pain and nausea.   Genitourinary: Negative for difficulty urinating and dysuria.   Musculoskeletal: Negative for arthralgias.   Skin: Negative for wound.   Neurological: Negative for dizziness, weakness and headaches.   Psychiatric/Behavioral: Negative for agitation.     Objective:     Vital Signs (Most Recent):  Temp: 98.3 °F (36.8 °C) (01/11/22 1648)  Pulse: 86 (01/12/22 0847)  Resp: (!) 23 (01/12/22 0847)  BP: 102/66 (01/12/22 0802)  SpO2: 96 % (01/12/22 0847) Vital Signs (24h Range):  Temp:  [98.2 °F (36.8 °C)-98.3 °F (36.8 °C)] 98.3 °F (36.8 °C)  Pulse:  [] 86  Resp:  [12-29] 23  SpO2:  [95 %-100 %] 96 %  BP: (102-150)/(51-75) 102/66     Weight: 94.3 kg (208 lb)  Body mass index is 38.04 kg/m².    Intake/Output Summary (Last 24 hours) at 1/12/2022 1020  Last data filed at 1/11/2022 2230  Gross per 24 hour   Intake 150 ml   Output --   Net 150 ml      Physical Exam  Vitals and nursing note reviewed.   Constitutional:       General: She is not in acute distress.  HENT:      Head: Normocephalic and atraumatic.      Nose: Nose normal.      Mouth/Throat:      Mouth: Mucous membranes are moist.   Eyes:      Pupils: Pupils are equal, round, and reactive to light.   Cardiovascular:      Rate and Rhythm: Normal rate and regular rhythm.      Pulses: Normal pulses.      Heart sounds: Normal heart sounds.   Pulmonary:      Effort: Pulmonary effort is normal. No respiratory distress.      Comments: diminished throughout, poor inspiratory  effort  Abdominal:      General: Bowel sounds are normal.      Palpations: Abdomen is soft.   Musculoskeletal:         General: No swelling. Normal range of motion.      Cervical back: Normal range of motion.   Skin:     General: Skin is warm and dry.   Neurological:      Mental Status: She is alert and oriented to person, place, and time.   Psychiatric:         Behavior: Behavior normal.         Thought Content: Thought content normal.         Significant Labs: All pertinent labs within the past 24 hours have been reviewed.    Significant Imaging: I have reviewed all pertinent imaging results/findings within the past 24 hours.

## 2022-01-12 NOTE — SUBJECTIVE & OBJECTIVE
History reviewed. No pertinent past medical history.    History reviewed. No pertinent surgical history.    Review of patient's allergies indicates:  No Known Allergies    No current facility-administered medications on file prior to encounter.     Current Outpatient Medications on File Prior to Encounter   Medication Sig    loratadine (CLARITIN) 10 mg tablet Take 1 tablet (10 mg total) by mouth once daily.     Family History    None       Tobacco Use    Smoking status: Never Smoker    Smokeless tobacco: Never Used   Substance and Sexual Activity    Alcohol use: Not on file    Drug use: Not on file    Sexual activity: Not on file     Review of Systems   Constitutional: Negative for chills, diaphoresis and fever.   HENT: Negative for hearing loss and sore throat.    Eyes: Negative for visual disturbance.   Respiratory: Positive for cough. Negative for shortness of breath and wheezing.    Cardiovascular: Positive for chest pain. Negative for leg swelling.   Gastrointestinal: Negative for abdominal pain, diarrhea, nausea and vomiting.   Genitourinary: Negative for difficulty urinating, dysuria and flank pain.   Musculoskeletal: Positive for back pain. Negative for neck pain.   Skin: Negative for rash and wound.   Neurological: Negative for dizziness, weakness and headaches.   Psychiatric/Behavioral: Negative for agitation, confusion and hallucinations.     Objective:     Vital Signs (Most Recent):  Temp: 98.3 °F (36.8 °C) (01/11/22 1648)  Pulse: 91 (01/11/22 1648)  Resp: (!) 23 (01/11/22 1648)  BP: 122/75 (01/11/22 1648)  SpO2: 100 % (01/11/22 1648) Vital Signs (24h Range):  Temp:  [98.2 °F (36.8 °C)-98.3 °F (36.8 °C)] 98.3 °F (36.8 °C)  Pulse:  [81-91] 91  Resp:  [18-27] 23  SpO2:  [98 %-100 %] 100 %  BP: (110-150)/(66-75) 122/75     Weight: 94.3 kg (208 lb)  Body mass index is 38.04 kg/m².    Physical Exam  Vitals and nursing note reviewed.   Constitutional:       General: She is not in acute distress.      Appearance: Normal appearance. She is not ill-appearing.   HENT:      Head: Normocephalic and atraumatic.      Mouth/Throat:      Mouth: Mucous membranes are moist.   Eyes:      Extraocular Movements: Extraocular movements intact.      Pupils: Pupils are equal, round, and reactive to light.   Cardiovascular:      Rate and Rhythm: Normal rate and regular rhythm.      Pulses: Normal pulses.      Heart sounds: Normal heart sounds. No murmur heard.  No friction rub. No gallop.    Pulmonary:      Effort: Pulmonary effort is normal. No respiratory distress.      Breath sounds: Examination of the right-middle field reveals rhonchi. Examination of the left-middle field reveals rhonchi. Examination of the right-lower field reveals decreased breath sounds. Examination of the left-lower field reveals decreased breath sounds. Decreased breath sounds and rhonchi present. No wheezing.   Abdominal:      General: Bowel sounds are normal. There is no distension.      Palpations: Abdomen is soft.      Tenderness: There is no abdominal tenderness. There is no guarding.   Musculoskeletal:         General: No swelling.      Cervical back: Normal range of motion and neck supple.      Right lower leg: No edema.      Left lower leg: No edema.   Skin:     General: Skin is warm and dry.      Capillary Refill: Capillary refill takes less than 2 seconds.      Findings: No bruising, erythema or rash.   Neurological:      General: No focal deficit present.      Mental Status: She is alert and oriented to person, place, and time.   Psychiatric:         Mood and Affect: Mood normal.         Behavior: Behavior normal. Behavior is cooperative.           CRANIAL NERVES     CN III, IV, VI   Pupils are equal, round, and reactive to light.       Significant Labs:   All pertinent labs within the past 24 hours have been reviewed.  Bilirubin:   Recent Labs   Lab 01/11/22  1153   BILITOT 1.1*     BMP:   Recent Labs   Lab 01/11/22  1153          K 4.5      CO2 24   BUN 10   CREATININE 0.8   CALCIUM 9.3     CBC:   Recent Labs   Lab 01/11/22  1153   WBC 16.56*   HGB 13.3   HCT 38.4        CMP:   Recent Labs   Lab 01/11/22  1153      K 4.5      CO2 24      BUN 10   CREATININE 0.8   CALCIUM 9.3   PROT 7.9   ALBUMIN 3.9   BILITOT 1.1*   ALKPHOS 72   AST 12   ALT 16   ANIONGAP 11   EGFRNONAA >60         Troponin:   Recent Labs   Lab 01/11/22  1153   TROPONINI <0.006     Urine Studies:   Recent Labs   Lab 01/11/22  1039   COLORU Yellow   APPEARANCEUA Clear   PHUR 6.0   SPECGRAV 1.015   PROTEINUA Negative   GLUCUA Negative   KETONESU Negative   BILIRUBINUA Negative   OCCULTUA 2+*   NITRITE Negative   UROBILINOGEN Negative   LEUKOCYTESUR Negative   RBCUA 4   WBCUA 2   BACTERIA Occasional   SQUAMEPITHEL 10   HYALINECASTS 1       Significant Imaging: I have reviewed all pertinent imaging results/findings within the past 24 hours.

## 2022-01-12 NOTE — ASSESSMENT & PLAN NOTE
-recently tested positive for COVID-19 and was diagnosed with PNA by PCP on 1/4/2022  -Patient completed azithromycin and decadron x 5 day course and failed outpatient therapy  -Levoquin 750mg IV [id number]  -Supplemental Oxygen for SpO2 <92%  -anti-tussives for cough PRN  -treated, stop levaquin, abefrile

## 2022-01-12 NOTE — ASSESSMENT & PLAN NOTE
Chest pain on exertion    -Presents with left chest pain worse with coughing and movement  -Initial troponin negative (<0.006)  -CTA chest revealed bilateral pulmonary emboli without evidence of right heart strain   -Started on Lovenox 1mg/kg q12hr  -Consulted Cardiology; appreciate recs; plan is to transition patient to OAC  -Continuous cardiac monitoring  -BLE venous US negative for DVT  -planning for discharge home today after seen by cardiology  -ambulatory O2 sat eval

## 2022-01-12 NOTE — CONSULTS
[facility] - Emergency Dept  Cardiology  Consult Note    Patient Name: Gayatri Henderson  MRN: [id number]  Admission Date: 1/11/2022  Hospital Length of Stay: 0 days  Code Status: Full Code   Attending Provider: Priscila Aldrich*   Consulting Provider: NIMO Roa ANP  Primary Care Physician: Primary Doctor No  Principal Problem:Bilateral pulmonary embolism    Patient information was obtained from past medical records and ER records.     Cardiology-[facility]  Consult performed by: NIMO Lockwood, ABRAHAN  Consult ordered by: Mercedes Cosby NP  Reason for consult: chest pain         Subjective:     Chief Complaint:  Chest pain and SOB      HPI:   42 yo female with no reported past medical history other than COVID 19 +12/23/2021 who presented to the ER with complaints of chest pain. She was in COVID isolation therefore her HPI was obtained from her admit H&P. She reports the chest pain started the evening prior to presentation while lying on her cough and it has been constant since that time. She describes the pain as left sided pressure-like sensation with associated radiation to her back and can increase with coughing and movement. She denies fever, fatigue, shortness of breath, hemoptysis, palpitations, nausea, vomiting, abdominal pain, or leg swelling. She reports she was recently seen by her PCP on 1/4/2022 and was prescribed azithromycin with Decadron x 5 days, which she completed. She also had an elevated D-dimer at 1.17. She denies history of DVTs or HRT or recent long travel. In the ER, sheunderwent CA taht showed bilateral PEs with no right heart strain and numerous ground-glass opacities in bilateral lungs; likely resolving multifocal pneumonia. Troponin <.006. BMP and CBC WNL. HR and BP stable. BLE venous ultrasound with no DVT. Echocardiogram pending. Admitted to [facility] for further evaluation. Cardiology consulted for evaluation of PEs      History reviewed. No  pertinent past medical history.    History reviewed. No pertinent surgical history.    Review of patient's allergies indicates:  No Known Allergies    No current facility-administered medications on file prior to encounter.     Current Outpatient Medications on File Prior to Encounter   Medication Sig    loratadine (CLARITIN) 10 mg tablet Take 1 tablet (10 mg total) by mouth once daily.     Family History     Problem Relation (Age of Onset)    No Known Problems Mother, Father        Tobacco Use    Smoking status: Never Smoker    Smokeless tobacco: Never Used   Substance and Sexual Activity    Alcohol use: Yes     Comment: socially    Drug use: Never    Sexual activity: Not Currently     Review of Systems   Cardiovascular: Positive for chest pain and dyspnea on exertion.   Respiratory: Positive for shortness of breath.      Objective:     Vital Signs (Most Recent):  Temp: 98.3 °F (36.8 °C) (01/11/22 1648)  Pulse: 86 (01/12/22 0847)  Resp: (!) 23 (01/12/22 0847)  BP: 102/66 (01/12/22 0802)  SpO2: 96 % (01/12/22 0847) Vital Signs (24h Range):  Temp:  [98.2 °F (36.8 °C)-98.3 °F (36.8 °C)] 98.3 °F (36.8 °C)  Pulse:  [] 86  Resp:  [12-29] 23  SpO2:  [95 %-100 %] 96 %  BP: (102-150)/(51-75) 102/66     Weight: 94.3 kg (208 lb)  Body mass index is 38.04 kg/m².    SpO2: 96 %  O2 Device (Oxygen Therapy): room air      Intake/Output Summary (Last 24 hours) at 1/12/2022 1020  Last data filed at 1/11/2022 2230  Gross per 24 hour   Intake 150 ml   Output --   Net 150 ml       Lines/Drains/Airways     Peripheral Intravenous Line                 Peripheral IV - Single Lumen 01/11/22 1254 20 G;1 in Left;Medial Antecubital <1 day                Physical Exam    Significant Labs:   BMP:   Recent Labs   Lab 01/11/22  1153 01/12/22  0409    92    137   K 4.5 3.9    102   CO2 24 26   BUN 10 15   CREATININE 0.8 0.8   CALCIUM 9.3 8.8   MG  --  1.8   , CBC   Recent Labs   Lab 01/11/22  1153 01/11/22  1153  01/12/22  0409   WBC 16.56*  --  11.27   HGB 13.3  --  11.3*   HCT 38.4   < > 33.2*     --  346    < > = values in this interval not displayed.    and Troponin   Recent Labs   Lab 01/11/22  1153   TROPONINI <0.006       Significant Imaging: Echocardiogram: Transthoracic echo (TTE) complete (Cupid Only): TTE pending     Assessment and Plan:     * Bilateral pulmonary embolism  - presented with chest pain and SOB with elevated D dimer and recent COVID infection  - CTA with bilateral PEs; BLE venous ultrasound with no evidence of DVT  - placed on SQ Lovenox; Cardiology consulted  - BNP normal and troponin negative and on room air; CT scan reviewed per Dr. Forman  - recommend Lovenox with transition to DOAC with either Xarelto or Eliquis- no need for thrombectomy       Multifocal pneumonia  - per CTA  - management per primary team     Chest pain on exertion  - troponin negative  - felt to be related to PE        VTE Risk Mitigation (From admission, onward)         Ordered     enoxaparin injection 90 mg  Every 12 hours         01/11/22 1531            Transition to oral DOAC and follow up as an outpatient    Thank you for your consult. I will sign off. Please contact us if you have any additional questions.    NIMO Roa, ANP  Cardiology   [facility] - Emergency Dept

## 2022-01-12 NOTE — HPI
40 yo female with no reported past medical history other than COVID 19 +12/23/2021 who presented to the ER with complaints of chest pain. She was in COVID isolation therefore her HPI was obtained from her admit H&P. She reports the chest pain started the evening prior to presentation while lying on her cough and it has been constant since that time. She describes the pain as left sided pressure-like sensation with associated radiation to her back and can increase with coughing and movement. She denies fever, fatigue, shortness of breath, hemoptysis, palpitations, nausea, vomiting, abdominal pain, or leg swelling. She reports she was recently seen by her PCP on 1/4/2022 and was prescribed azithromycin with Decadron x 5 days, which she completed. She also had an elevated D-dimer at 1.17. She denies history of DVTs or HRT or recent long travel. In the ER, sheunderwent CA taht showed bilateral PEs with no right heart strain and numerous ground-glass opacities in bilateral lungs; likely resolving multifocal pneumonia. Troponin <.006. BMP and CBC WNL. HR and BP stable. BLE venous ultrasound with no DVT. Echocardiogram pending. Admitted to [facility] for further evaluation. Cardiology consulted for evaluation of PEs

## 2022-01-12 NOTE — PROGRESS NOTES
[facility] Emergency Dept  [facility] Medicine  Progress Note    Patient Name: Gayatri Henderson  MRN: [id number]  Patient Class: OP- Observation   Admission Date: 1/11/2022  Length of Stay: 0 days  Attending Physician: Priscila Aldrich*  Primary Care Provider: Primary Doctor No        Subjective:     Principal Problem:Bilateral pulmonary embolism        HPI:  Ms. Gayatri Henderson is a 42 y/o female with no known medical history presents to [facility] ED with complaints of chest pain that started last night around 2130 while at home lying on her couch. Patient reports her chest pain has been constant since last night. Patient reports her chest pain is located on her left side, constant, pressure-like, radiates to back, and 10/10 on numeric pain scale. Patient reports her chest pain is worse with coughing and movement. Patient denies taking any medication for her symptoms. She was recently diagnosed with COVID-19 infection on 12/23/21, which patient reports the symptoms had improved. She denies fever, fatigue, shortness of breath, hemoptysis, palpitations, nausea, vomiting, abdominal pain, or leg swelling. She reports she was recently seen by her PCP on 1/4/2022 and was prescribed azithromycin with Decadron x 5 days, which she has completed. She also reports her PCP recently informed her that her D-dimer is elevated (1.17). She denies history of DVTs or HRT. She denies recent travel. She tobacco or illicit drug use. She is not COVID-19 vaccinated. She also complains of constipation. Her last BM was this [facility]. In ED: CTA chest revealed bilateral pulmonary emboli without evidence of right heart strain and numerous small nodular and ground-glass opacities in bilateral lungs; likely sequela of resolving multifocal pneumonia.       Overview/Hospital Course:  No notes on file    Interval History: She feels better. On room air. Still has some SOB and chest pain on exertion. BLE negative for DVT.      Review of  Systems   Constitutional: Negative for appetite change, chills and fever.   HENT: Negative for congestion.    Eyes: Negative for visual disturbance.   Respiratory: Positive for shortness of breath (on exertion).    Cardiovascular: Positive for chest pain (with deep breath). Negative for leg swelling.   Gastrointestinal: Negative for abdominal pain and nausea.   Genitourinary: Negative for difficulty urinating and dysuria.   Musculoskeletal: Negative for arthralgias.   Skin: Negative for wound.   Neurological: Negative for dizziness, weakness and headaches.   Psychiatric/Behavioral: Negative for agitation.     Objective:     Vital Signs (Most Recent):  Temp: 98.3 °F (36.8 °C) (01/11/22 1648)  Pulse: 86 (01/12/22 0847)  Resp: (!) 23 (01/12/22 0847)  BP: 102/66 (01/12/22 0802)  SpO2: 96 % (01/12/22 0847) Vital Signs (24h Range):  Temp:  [98.2 °F (36.8 °C)-98.3 °F (36.8 °C)] 98.3 °F (36.8 °C)  Pulse:  [] 86  Resp:  [12-29] 23  SpO2:  [95 %-100 %] 96 %  BP: (102-150)/(51-75) 102/66     Weight: 94.3 kg (208 lb)  Body mass index is 38.04 kg/m².    Intake/Output Summary (Last 24 hours) at 1/12/2022 1020  Last data filed at 1/11/2022 2230  Gross per 24 hour   Intake 150 ml   Output --   Net 150 ml      Physical Exam  Vitals and nursing note reviewed.   Constitutional:       General: She is not in acute distress.  HENT:      Head: Normocephalic and atraumatic.      Nose: Nose normal.      Mouth/Throat:      Mouth: Mucous membranes are moist.   Eyes:      Pupils: Pupils are equal, round, and reactive to light.   Cardiovascular:      Rate and Rhythm: Normal rate and regular rhythm.      Pulses: Normal pulses.      Heart sounds: Normal heart sounds.   Pulmonary:      Effort: Pulmonary effort is normal. No respiratory distress.      Comments: diminished throughout, poor inspiratory effort  Abdominal:      General: Bowel sounds are normal.      Palpations: Abdomen is soft.   Musculoskeletal:         General: No swelling.  Normal range of motion.      Cervical back: Normal range of motion.   Skin:     General: Skin is warm and dry.   Neurological:      Mental Status: She is alert and oriented to person, place, and time.   Psychiatric:         Behavior: Behavior normal.         Thought Content: Thought content normal.         Significant Labs: All pertinent labs within the past 24 hours have been reviewed.    Significant Imaging: I have reviewed all pertinent imaging results/findings within the past 24 hours.      Assessment/Plan:      * Bilateral pulmonary embolism  Chest pain on exertion    -Presents with left chest pain worse with coughing and movement  -Initial troponin negative (<0.006)  -CTA chest revealed bilateral pulmonary emboli without evidence of right heart strain   -Started on Lovenox 1mg/kg q12hr  -Consulted Cardiology; appreciate recs; plan is to transition patient to OAC  -Continuous cardiac monitoring  -BLE venous US negative for DVT  -planning for discharge home today after seen by cardiology  -ambulatory O2 sat eval      Leukocytosis  -WBC 16.56 on admit  -likely 2/2 to PE  -stop levaquin  -WBC normal today, no fevers      Multifocal pneumonia  -recently tested positive for COVID-19 and was diagnosed with PNA by PCP on 1/4/2022  -Patient completed azithromycin and decadron x 5 day course and failed outpatient therapy  -Levoquin 750mg IV [id number]  -Supplemental Oxygen for SpO2 <92%  -anti-tussives for cough PRN  -treated, stop levaquin, abefrile        Constipation  -Patient reported last BM was  -XRay of abdomen revealed mild amount of scattered fecal material throughout the colon and nonobstructive bowel gas pattern   -Will initiate bowel regimen  -Encourage fluid intake and OOB as tolerated      Chest pain on exertion  2/2 PE  Troponin normal      VTE Risk Mitigation (From admission, onward)         Ordered     enoxaparin injection 90 mg  Every 12 hours         01/11/22 1531                Discharge  Planning   NAHID:      Code Status: Full Code   Is the patient medically ready for discharge?:     Reason for patient still in hospital (select all that apply): Consult recommendations                     Lupe Jones NP  Department of Hospital Medicine   [facility] - Emergency Dept

## 2022-01-15 NOTE — DISCHARGE SUMMARY
[facility] Emergency Dept  Hospital Medicine  Discharge Summary      Patient Name: Gayatri Henderson  MRN: [id number]  Patient Class: OP- Observation  Admission Date: 1/11/2022  Hospital Length of Stay: 0 days  Discharge Date and Time: 1/12/2022  4:21 PM  Attending Physician: No att. providers found   Discharging Provider: Lupe Jones NP  Primary Care Provider: Primary Doctor No      HPI:   Ms. Gayatri Henderson is a 42 y/o female with no known medical history presents to [facility] ED with complaints of chest pain that started last night around 2130 while at home lying on her couch. Patient reports her chest pain has been constant since last night. Patient reports her chest pain is located on her left side, constant, pressure-like, radiates to back, and 10/10 on numeric pain scale. Patient reports her chest pain is worse with coughing and movement. Patient denies taking any medication for her symptoms. She was recently diagnosed with COVID-19 infection on 12/23/21, which patient reports the symptoms had improved. She denies fever, fatigue, shortness of breath, hemoptysis, palpitations, nausea, vomiting, abdominal pain, or leg swelling. She reports she was recently seen by her PCP on 1/4/2022 and was prescribed azithromycin with Decadron x 5 days, which she has completed. She also reports her PCP recently informed her that her D-dimer is elevated (1.17). She denies history of DVTs or HRT. She denies recent travel. She tobacco or illicit drug use. She is not COVID-19 vaccinated. She also complains of constipation. Her last BM was this [facility]. In ED: CTA chest revealed bilateral pulmonary emboli without evidence of right heart strain and numerous small nodular and ground-glass opacities in bilateral lungs; likely sequela of resolving multifocal pneumonia.       * No surgery found *      Hospital Course:   Ms. Henderson was admitted with shortness of breath on exertion and chest pain s/p recent COVID diagnosis.  She was found to have bilateral pulmonary emboli. BLE US was negative for DVT. Cardiology was consulted for eval. Echo was completed with normal findings. Troponin negative. The patient did not require oxygen on ambulation. She was discharged home on eliquis. Follow up with cardiology in 3 months.        Goals of Care Treatment Preferences:  Code Status: Full Code      Consults:   Consults (From admission, onward)        Status Ordering Provider     Cardiology-[facility]  Once        Provider:  (Not yet assigned)    Completed SARITHA-DALIA PACHECO          * Bilateral pulmonary embolism  Chest pain on exertion    -Presents with left chest pain worse with coughing and movement  -Initial troponin negative (<0.006)  -CTA chest revealed bilateral pulmonary emboli without evidence of right heart strain   -Started on Lovenox 1mg/kg q12hr  -Consulted Cardiology; appreciate recs; plan is to transition patient to [facility]  -Continuous cardiac monitoring  -BLE venous US negative for DVT  -planning for discharge home today after seen by cardiology  -ambulatory O2 sat eval--does not require oxygen  -discharge home on eliquis      Multifocal pneumonia  -recently tested positive for COVID-19 and was diagnosed with PNA by PCP on 1/4/2022  -Patient completed azithromycin and decadron x 5 day course and failed outpatient therapy  -Levoquin 750mg IV [id number]  -Supplemental Oxygen for SpO2 <92%  -anti-tussives for cough PRN  -treated, stop levaquin, abefrile  -discharge home, does not require oxygen        Chest pain on exertion  2/2 PE  Troponin normal  toradol given        Final Active Diagnoses:    Diagnosis Date Noted POA    PRINCIPAL PROBLEM:  Bilateral pulmonary embolism [I26.99] 01/11/2022 Yes    Chest pain on exertion [R07.9] 01/11/2022 Yes    Constipation [K59.00] 01/11/2022 Yes    Multifocal pneumonia [J18.9] 01/11/2022 Yes    Leukocytosis [D72.829] 01/11/2022 Yes      Problems Resolved During this Admission:        Discharged Condition: stable    Disposition: Home or Self Care    Follow Up:    Patient Instructions:      Ambulatory referral/consult to Cardiology   Standing Status: Future   Referral Priority: Routine Referral Type: Consultation   Referral Reason: Specialty Services Required   Referred to Provider: HERMAN DUMONT Specialty: Cardiology     Notify your health care provider if you experience any of the following:  temperature >100.4     Notify your health care provider if you experience any of the following:  persistent nausea and vomiting or diarrhea     Notify your health care provider if you experience any of the following:  severe uncontrolled pain     Notify your health care provider if you experience any of the following:  redness, tenderness, or signs of infection (pain, swelling, redness, odor or green/yellow discharge around incision site)     Notify your health care provider if you experience any of the following:  difficulty breathing or increased cough     Notify your health care provider if you experience any of the following:  persistent dizziness, light-headedness, or visual disturbances     Notify your health care provider if you experience any of the following:  increased confusion or weakness     Activity as tolerated       Significant Diagnostic Studies:     Pending Diagnostic Studies:     None         Medications:  Reconciled Home Medications:      Medication List      START taking these medications    * ELIQUIS 5 mg Tab  Generic drug: apixaban  Take 2 tablets (10 mg total) by mouth 2 (two) times daily for first 7 days. Then start taking 1 tablet ( 5mg ) by mouth twice daily     * apixaban 5 mg Tab  Commonly known as: ELIQUIS  Take 1 tablet (5 mg total) by mouth 2 (two) times daily.  Start taking on: January 19, 2022         * This list has 2 medication(s) that are the same as other medications prescribed for you. Read the directions carefully, and ask your doctor or other care  provider to review them with you.            CONTINUE taking these medications    loratadine 10 mg tablet  Commonly known as: CLARITIN  Take 1 tablet (10 mg total) by mouth once daily.            Indwelling Lines/Drains at time of discharge:   Lines/Drains/Airways     None                 Time spent on the discharge of patient: 45 minutes         Lupe Jones NP  Department of Hospital Medicine  [facility] - Emergency Dept

## 2022-01-15 NOTE — HOSPITAL COURSE
Ms. Mcginnis was admitted with shortness of breath on exertion and chest pain s/p recent COVID diagnosis. She was found to have bilateral pulmonary emboli. BLE US was negative for DVT. Cardiology was consulted for eval. Echo was completed with normal findings. Troponin negative. The patient did not require oxygen on ambulation. She was discharged home on eliquis. Follow up with cardiology in 3 months.

## 2022-01-15 NOTE — ASSESSMENT & PLAN NOTE
-recently tested positive for COVID-19 and was diagnosed with PNA by PCP on 1/4/2022  -Patient completed azithromycin and decadron x 5 day course and failed outpatient therapy  -Levoquin 750mg IV [id number]  -Supplemental Oxygen for SpO2 <92%  -anti-tussives for cough PRN  -treated, stop levaquin, abefrile  -discharge home, does not require oxygen

## 2022-01-15 NOTE — ASSESSMENT & PLAN NOTE
Chest pain on exertion    -Presents with left chest pain worse with coughing and movement  -Initial troponin negative (<0.006)  -CTA chest revealed bilateral pulmonary emboli without evidence of right heart strain   -Started on Lovenox 1mg/kg q12hr  -Consulted Cardiology; appreciate recs; plan is to transition patient to [facility]  -Continuous cardiac monitoring  -BLE venous US negative for DVT  -planning for discharge home today after seen by cardiology  -ambulatory O2 sat eval--does not require oxygen  -discharge home on eliquis

## 2022-01-18 ENCOUNTER — OFFICE VISIT (OUTPATIENT)
Dept: INTERNAL MEDICINE | Facility: CLINIC | Age: 42
End: 2022-01-18
Payer: COMMERCIAL

## 2022-01-18 ENCOUNTER — HOSPITAL ENCOUNTER (OUTPATIENT)
Dept: RADIOLOGY | Facility: HOSPITAL | Age: 42
Discharge: HOME | End: 2022-01-18
Attending: FAMILY MEDICINE
Payer: COMMERCIAL

## 2022-01-18 VITALS
HEIGHT: 62 IN | HEART RATE: 103 BPM | DIASTOLIC BLOOD PRESSURE: 70 MMHG | OXYGEN SATURATION: 98 % | BODY MASS INDEX: 39.07 KG/M2 | TEMPERATURE: 99 F | WEIGHT: 212.31 LBS | SYSTOLIC BLOOD PRESSURE: 116 MMHG

## 2022-01-18 DIAGNOSIS — R05.8 PRODUCTIVE COUGH: ICD-10-CM

## 2022-01-18 DIAGNOSIS — I26.99 BILATERAL PULMONARY EMBOLISM: ICD-10-CM

## 2022-01-18 DIAGNOSIS — J18.9 MULTIFOCAL PNEUMONIA: ICD-10-CM

## 2022-01-18 DIAGNOSIS — Z82.49 FAMILY HISTORY OF BLOOD CLOTS: ICD-10-CM

## 2022-01-18 PROCEDURE — 1159F MED LIST DOCD IN RCRD: CPT | Mod: CPTII,S$GLB,, | Performed by: FAMILY MEDICINE

## 2022-01-18 PROCEDURE — 1160F RVW MEDS BY RX/DR IN RCRD: CPT | Mod: CPTII,S$GLB,, | Performed by: FAMILY MEDICINE

## 2022-01-18 PROCEDURE — 3008F BODY MASS INDEX DOCD: CPT | Mod: CPTII,S$GLB,, | Performed by: FAMILY MEDICINE

## 2022-01-18 PROCEDURE — 71046 X-RAY EXAM CHEST 2 VIEWS: CPT | Mod: TC

## 2022-01-18 PROCEDURE — 87070 CULTURE OTHR SPECIMN AEROBIC: CPT | Performed by: FAMILY MEDICINE

## 2022-01-18 PROCEDURE — 3078F DIAST BP <80 MM HG: CPT | Mod: CPTII,S$GLB,, | Performed by: FAMILY MEDICINE

## 2022-01-18 PROCEDURE — 3008F PR BODY MASS INDEX (BMI) DOCUMENTED: ICD-10-PCS | Mod: CPTII,S$GLB,, | Performed by: FAMILY MEDICINE

## 2022-01-18 PROCEDURE — 71046 X-RAY EXAM CHEST 2 VIEWS: CPT | Mod: 26,,, | Performed by: RADIOLOGY

## 2022-01-18 PROCEDURE — 1160F PR REVIEW ALL MEDS BY PRESCRIBER/CLIN PHARMACIST DOCUMENTED: ICD-10-PCS | Mod: CPTII,S$GLB,, | Performed by: FAMILY MEDICINE

## 2022-01-18 PROCEDURE — 3074F PR MOST RECENT SYSTOLIC BLOOD PRESSURE < 130 MM HG: ICD-10-PCS | Mod: CPTII,S$GLB,, | Performed by: FAMILY MEDICINE

## 2022-01-18 PROCEDURE — 99214 PR OFFICE/OUTPT VISIT, EST, LEVL IV, 30-39 MIN: ICD-10-PCS | Mod: S$GLB,,, | Performed by: FAMILY MEDICINE

## 2022-01-18 PROCEDURE — 3078F PR MOST RECENT DIASTOLIC BLOOD PRESSURE < 80 MM HG: ICD-10-PCS | Mod: CPTII,S$GLB,, | Performed by: FAMILY MEDICINE

## 2022-01-18 PROCEDURE — 99999 PR PBB SHADOW E&M-EST. PATIENT-LVL IV: CPT | Mod: PBBFAC,,, | Performed by: FAMILY MEDICINE

## 2022-01-18 PROCEDURE — 87147 CULTURE TYPE IMMUNOLOGIC: CPT | Performed by: FAMILY MEDICINE

## 2022-01-18 PROCEDURE — 1159F PR MEDICATION LIST DOCUMENTED IN MEDICAL RECORD: ICD-10-PCS | Mod: CPTII,S$GLB,, | Performed by: FAMILY MEDICINE

## 2022-01-18 PROCEDURE — 99214 OFFICE O/P EST MOD 30 MIN: CPT | Mod: S$GLB,,, | Performed by: FAMILY MEDICINE

## 2022-01-18 PROCEDURE — 99999 PR PBB SHADOW E&M-EST. PATIENT-LVL IV: ICD-10-PCS | Mod: PBBFAC,,, | Performed by: FAMILY MEDICINE

## 2022-01-18 PROCEDURE — 87205 SMEAR GRAM STAIN: CPT | Performed by: FAMILY MEDICINE

## 2022-01-18 PROCEDURE — 3074F SYST BP LT 130 MM HG: CPT | Mod: CPTII,S$GLB,, | Performed by: FAMILY MEDICINE

## 2022-01-18 PROCEDURE — 71046 XR CHEST PA AND LATERAL: ICD-10-PCS | Mod: 26,,, | Performed by: RADIOLOGY

## 2022-01-18 RX ORDER — BENZONATATE 100 MG/1
100 CAPSULE ORAL 3 TIMES DAILY PRN
Qty: 30 CAPSULE | Refills: 0 | Status: SHIPPED | OUTPATIENT
Start: 2022-01-18 | End: 2022-01-18 | Stop reason: CLARIF

## 2022-01-18 RX ORDER — BENZONATATE 100 MG/1
100 CAPSULE ORAL 3 TIMES DAILY PRN
Qty: 30 CAPSULE | Refills: 1 | Status: SHIPPED | OUTPATIENT
Start: 2022-01-18 | End: 2022-01-28

## 2022-01-18 NOTE — LETTER
January 18, 2022      [facility]  [address]  [facility] [address]  Phone: [phone]  Fax: [phone]       Patient: Gayatri Henderson  YOB: 1980  Date of Visit: 01/18/2022    To Whom It May Concern:    Gayatri Henderson was at [facility] on 01/18/2022. Please excuse patient from work from 1/11/22 to present due to COVID-19 infection, multifocal pneumonia, and associated bilateral pulmonary emboli (blood clots in the lungs); return to work date yet to be determined. If you have any questions or concerns, or if I can be of further assistance, please do not hesitate to contact my office.    Sincerely,    Antonio Vargas MD

## 2022-01-18 NOTE — PROGRESS NOTES
Subjective:      Patient ID: Gayatri Henderson is a 41 y.o. female.    Chief Complaint: Follow-up (ER)      HPI:  Gayatri Henderson is a 41 year old female who presents to clinic today for hospital follow up.    Patient new to me; no PCP listed on file.  Patient is accompanied by her aunt, Maria Isabel Presley today.    Initially presented to ER 1/11/22 due to left sided chest pain at rest, reported she had been diagnosed with COVID-19 at the end of December 2021 and that her symptoms had improved.  She reported she was told by her provider she had an elevated D-dimer.  Pain improved with Toradol.  CTA revealed bilateral pulmonary emboli without evidence of right heart strain.  BLE US was negative for DVT. Cardiology was consulted for eval. Echo was completed with normal findings. Troponin negative. The patient did not require oxygen on ambulation. She was discharged home on eliquis. Follow up with cardiology in 3 months.  Was treated with Levaquin while hospitalized for multifocal PNA, this was discontinued prior to discharge.    CTA Chest 1/11/22:  1. Bilateral pulmonary emboli as detailed above.  No evidence of right heart strain.  2. Numerous small nodular and ground-glass opacities in the bilateral lungs, likely sequela of resolving multifocal pneumonia.  Follow-up is recommended at the resolution of the patient's acute symptoms.    Reports compliance with Eliquis.  Coughing has persisted.  Reports she feels a little better each day.  Pain was sharp now more dull in the chest.  Symptoms worse with cough or exertion.  Cough is very persistent on exam today.  Reports she has felt subjectively warm with associated diaphoresis since hospital discharge.      Has appt with cardiology 3/28/22.  Mom has history of DVT.  Maternal aunt also found to have brain thrombosis resulting in clot.      Has noticed some hemoptysis today and yesterday a few times, little specks of blood in the sputum.    Diagnosed with COVID  12/23/21.  Out for 2 weeks from work.  Couldn't return to work on 1/4/22 due to chest pain so went to ER.  Was told not to return to work until 1/10/22.  Returned to work to 1/10/22 but had persistent symptoms so on 1/11/22 went into the ER due to chest pain.  Reports she needs FMLA papers.      Denies personal history of PE/DVT, OCP use, tobacco use.    History reviewed. No pertinent past medical history.    History reviewed. No pertinent surgical history.    Family History   Problem Relation Age of Onset    No Known Problems Mother     No Known Problems Father        Social History     Socioeconomic History    Marital status: Single   Tobacco Use    Smoking status: Never Smoker    Smokeless tobacco: Never Used   Substance and Sexual Activity    Alcohol use: Yes     Comment: socially    Drug use: Never    Sexual activity: Not Currently       Review of Systems   Constitutional: Positive for fatigue. Negative for chills and fever.   HENT: Positive for congestion. Negative for hearing loss, nosebleeds, rhinorrhea, sore throat and trouble swallowing.    Eyes: Negative for pain and visual disturbance.   Respiratory: Positive for cough. Negative for shortness of breath and wheezing.    Cardiovascular: Positive for chest pain. Negative for palpitations.   Gastrointestinal: Negative for abdominal distention, abdominal pain, constipation, diarrhea and nausea.   Genitourinary: Negative for decreased urine volume, difficulty urinating, dysuria, hematuria and urgency.   Musculoskeletal: Negative for arthralgias, back pain and myalgias.   Skin: Negative for color change and rash.   Neurological: Negative for dizziness, tremors, weakness, light-headedness, numbness and headaches.   Psychiatric/Behavioral: Negative for agitation, behavioral problems and confusion. The patient is nervous/anxious.      Objective:     Vitals:    01/18/22 1539   BP: 116/70   BP Location: Left arm   Patient Position: Sitting   BP Method:  "Medium (Manual)   Pulse: 103   Temp: 98.6 °F (37 °C)   TempSrc: Oral   SpO2: 98%   Weight: 96.3 kg (212 lb 4.9 oz)   Height: 5' 2" (1.575 m)       Physical Exam  Vitals and nursing note reviewed.   Constitutional:       Appearance: She is well-developed. She is obese.   HENT:      Head: Normocephalic and atraumatic.      Right Ear: External ear normal.      Left Ear: External ear normal.      Nose: Nose normal.   Eyes:      Conjunctiva/sclera: Conjunctivae normal.   Neck:      Trachea: No tracheal deviation.   Cardiovascular:      Rate and Rhythm: Regular rhythm. Tachycardia present.      Heart sounds: Normal heart sounds. No murmur heard.  No friction rub. No gallop.    Pulmonary:      Effort: No respiratory distress.      Breath sounds: No wheezing or rales.      Comments: Profuse coughing during entire exam noted  Abdominal:      General: Bowel sounds are normal. There is no distension.      Palpations: Abdomen is soft.      Tenderness: There is generalized abdominal tenderness (Related to Lovenox administration and subsequent bruising). There is no guarding or rebound.   Musculoskeletal:      Cervical back: Neck supple.   Skin:     General: Skin is warm and dry.   Neurological:      Mental Status: She is alert.   Psychiatric:         Behavior: Behavior normal.        Assessment:      1. Bilateral pulmonary embolism    2. Multifocal pneumonia    3. Productive cough    4. Family history of blood clots      Plan:   Gayatri was seen today for follow-up.    Diagnoses and all orders for this visit:    Bilateral pulmonary embolism  -     Ambulatory referral/consult to Hematology / Oncology; Future for hypercoagulability work up.  -     Continue Eliquis for at least 3 months, reviewed ER precautions, f/u with cardiology as scheduled, do not discontinue Eliquis without physician approval, to notify me if needing refill prior to cardiology visit or if she can establish care with a provider who is accepting new patients " can f/u with them as needed    Multifocal pneumonia  -     X-Ray Chest PA And Lateral; Future  -     Culture, Respiratory with Gram Stain; treated with Levaquin while hospitalized, reassess CXR and sputum culture    Productive cough  -     X-Ray Chest PA And Lateral; Future  -     Culture, Respiratory with Gram Stain  -     Start benzonatate (TESSALON) 100 MG capsule; Take 1 capsule (100 mg total) by mouth 3 (three) times daily as needed for Cough.  Instructed patient to notify me if no improvement.    Family history of blood clots  -     Ambulatory referral/consult to Hematology / Oncology; Future for hypercoagulability work up.     Recommended establish care appointment with a provider who's taking new patients as I'm currently over my patient cap and am not accepting further new patients at this time.  Work note for 1/11/22 - present provided.  Recommend sending FMLA papers to PCP once established or can send to me if unable to establish in a timely manner.

## 2022-01-19 ENCOUNTER — HOSPITAL ENCOUNTER (EMERGENCY)
Facility: HOSPITAL | Age: 42
Discharge: HOME | End: 2022-01-19
Attending: EMERGENCY MEDICINE
Payer: COMMERCIAL

## 2022-01-19 VITALS
HEIGHT: 62 IN | HEART RATE: 89 BPM | SYSTOLIC BLOOD PRESSURE: 111 MMHG | RESPIRATION RATE: 18 BRPM | DIASTOLIC BLOOD PRESSURE: 69 MMHG | OXYGEN SATURATION: 97 % | TEMPERATURE: 99 F | BODY MASS INDEX: 38.83 KG/M2 | WEIGHT: 211 LBS

## 2022-01-19 DIAGNOSIS — R05.9 COUGH: ICD-10-CM

## 2022-01-19 DIAGNOSIS — J18.9 PNEUMONIA OF LEFT LOWER LOBE DUE TO INFECTIOUS ORGANISM: Primary | ICD-10-CM

## 2022-01-19 LAB
ALBUMIN SERPL BCP-MCNC: 3.1 G/DL (ref 3.5–5.2)
ALP SERPL-CCNC: 78 U/L (ref 55–135)
ALT SERPL W/O P-5'-P-CCNC: 19 U/L (ref 10–44)
ANION GAP SERPL CALC-SCNC: 10 MMOL/L (ref 8–16)
AST SERPL-CCNC: 23 U/L (ref 10–40)
BASOPHILS # BLD AUTO: 0.03 K/UL (ref 0–0.2)
BASOPHILS NFR BLD: 0.3 % (ref 0–1.9)
BILIRUB SERPL-MCNC: 0.4 MG/DL (ref 0.1–1)
BUN SERPL-MCNC: 7 MG/DL (ref 6–20)
CALCIUM SERPL-MCNC: 9.5 MG/DL (ref 8.7–10.5)
CHLORIDE SERPL-SCNC: 101 MMOL/L (ref 95–110)
CO2 SERPL-SCNC: 22 MMOL/L (ref 23–29)
CREAT SERPL-MCNC: 0.7 MG/DL (ref 0.5–1.4)
DIFFERENTIAL METHOD: ABNORMAL
EOSINOPHIL # BLD AUTO: 0.2 K/UL (ref 0–0.5)
EOSINOPHIL NFR BLD: 1.8 % (ref 0–8)
ERYTHROCYTE [DISTWIDTH] IN BLOOD BY AUTOMATED COUNT: 14.7 % (ref 11.5–14.5)
EST. GFR  (AFRICAN AMERICAN): >60 ML/MIN/1.73 M^2
EST. GFR  (NON AFRICAN AMERICAN): >60 ML/MIN/1.73 M^2
GLUCOSE SERPL-MCNC: 100 MG/DL (ref 70–110)
HCT VFR BLD AUTO: 35 % (ref 37–48.5)
HGB BLD-MCNC: 11.5 G/DL (ref 12–16)
IMM GRANULOCYTES # BLD AUTO: 0.06 K/UL (ref 0–0.04)
IMM GRANULOCYTES NFR BLD AUTO: 0.6 % (ref 0–0.5)
LACTATE SERPL-SCNC: 1.1 MMOL/L (ref 0.5–2.2)
LYMPHOCYTES # BLD AUTO: 2.3 K/UL (ref 1–4.8)
LYMPHOCYTES NFR BLD: 23.2 % (ref 18–48)
MCH RBC QN AUTO: 26.6 PG (ref 27–31)
MCHC RBC AUTO-ENTMCNC: 32.9 G/DL (ref 32–36)
MCV RBC AUTO: 81 FL (ref 82–98)
MONOCYTES # BLD AUTO: 1.1 K/UL (ref 0.3–1)
MONOCYTES NFR BLD: 11.4 % (ref 4–15)
NEUTROPHILS # BLD AUTO: 6.2 K/UL (ref 1.8–7.7)
NEUTROPHILS NFR BLD: 62.7 % (ref 38–73)
NRBC BLD-RTO: 0 /100 WBC
PLATELET # BLD AUTO: 329 K/UL (ref 150–450)
PMV BLD AUTO: 11.1 FL (ref 9.2–12.9)
POTASSIUM SERPL-SCNC: 5 MMOL/L (ref 3.5–5.1)
PROCALCITONIN SERPL IA-MCNC: 0.05 NG/ML
PROT SERPL-MCNC: 8 G/DL (ref 6–8.4)
RBC # BLD AUTO: 4.32 M/UL (ref 4–5.4)
SODIUM SERPL-SCNC: 133 MMOL/L (ref 136–145)
WBC # BLD AUTO: 9.95 K/UL (ref 3.9–12.7)

## 2022-01-19 PROCEDURE — 85025 COMPLETE CBC W/AUTO DIFF WBC: CPT | Performed by: PHYSICIAN ASSISTANT

## 2022-01-19 PROCEDURE — 99284 PR EMERGENCY DEPT VISIT,LEVEL IV: ICD-10-PCS | Mod: ,,, | Performed by: EMERGENCY MEDICINE

## 2022-01-19 PROCEDURE — 99284 EMERGENCY DEPT VISIT MOD MDM: CPT | Mod: ,,, | Performed by: EMERGENCY MEDICINE

## 2022-01-19 PROCEDURE — 99284 EMERGENCY DEPT VISIT MOD MDM: CPT | Mod: 25

## 2022-01-19 PROCEDURE — 84145 PROCALCITONIN (PCT): CPT | Performed by: PHYSICIAN ASSISTANT

## 2022-01-19 PROCEDURE — 25000003 PHARM REV CODE 250: Performed by: PHYSICIAN ASSISTANT

## 2022-01-19 PROCEDURE — 83605 ASSAY OF LACTIC ACID: CPT | Performed by: PHYSICIAN ASSISTANT

## 2022-01-19 PROCEDURE — 63600175 PHARM REV CODE 636 W HCPCS: Performed by: PHYSICIAN ASSISTANT

## 2022-01-19 PROCEDURE — 96361 HYDRATE IV INFUSION ADD-ON: CPT

## 2022-01-19 PROCEDURE — 96365 THER/PROPH/DIAG IV INF INIT: CPT

## 2022-01-19 PROCEDURE — 80053 COMPREHEN METABOLIC PANEL: CPT | Performed by: PHYSICIAN ASSISTANT

## 2022-01-19 PROCEDURE — 96366 THER/PROPH/DIAG IV INF ADDON: CPT

## 2022-01-19 RX ORDER — ACETAMINOPHEN 500 MG
1000 TABLET ORAL
Status: COMPLETED | OUTPATIENT
Start: 2022-01-19 | End: 2022-01-19

## 2022-01-19 RX ORDER — PROMETHAZINE HYDROCHLORIDE AND DEXTROMETHORPHAN HYDROBROMIDE 6.25; 15 MG/5ML; MG/5ML
5 SYRUP ORAL EVERY 4 HOURS PRN
Qty: 473 ML | Refills: 0 | Status: SHIPPED | OUTPATIENT
Start: 2022-01-19 | End: 2022-01-29

## 2022-01-19 RX ORDER — LEVOFLOXACIN 250 MG/1
750 TABLET ORAL DAILY
Qty: 15 TABLET | Refills: 0 | Status: SHIPPED | OUTPATIENT
Start: 2022-01-19 | End: 2022-01-24

## 2022-01-19 RX ORDER — LEVOFLOXACIN 5 MG/ML
750 INJECTION, SOLUTION INTRAVENOUS
Status: COMPLETED | OUTPATIENT
Start: 2022-01-19 | End: 2022-01-19

## 2022-01-19 RX ORDER — PROMETHAZINE HYDROCHLORIDE AND CODEINE PHOSPHATE 6.25; 1 MG/5ML; MG/5ML
5 SOLUTION ORAL
Status: COMPLETED | OUTPATIENT
Start: 2022-01-19 | End: 2022-01-19

## 2022-01-19 RX ADMIN — SODIUM CHLORIDE, SODIUM LACTATE, POTASSIUM CHLORIDE, AND CALCIUM CHLORIDE 1000 ML: .6; .31; .03; .02 INJECTION, SOLUTION INTRAVENOUS at 06:01

## 2022-01-19 RX ADMIN — LEVOFLOXACIN 750 MG: 750 INJECTION, SOLUTION INTRAVENOUS at 07:01

## 2022-01-19 RX ADMIN — PROMETHAZINE HYDROCHLORIDE AND CODEINE PHOSPHATE 5 ML: 10; 6.25 SOLUTION ORAL at 08:01

## 2022-01-19 RX ADMIN — ACETAMINOPHEN 1000 MG: 500 TABLET ORAL at 06:01

## 2022-01-19 NOTE — ED NOTES
"Pt states that she was called today with "Abnormal XRay"-- possible "Covid Pneumonia"--- states was diagnosed with Covid 12/23, Covid Pneumonia 1/4, and bilateral PEs 1/11----- persistent cough/ currently taking blood thinners for PE--- Denies chest pain, denies shortness of breath- constant cough/ productive cough  "

## 2022-01-20 NOTE — ED PROVIDER NOTES
Encounter Date: 1/19/2022       History     Chief Complaint   Patient presents with    covid positive     Dx with pneumonia on the 4th, and pe on the 11th, had another cxr and pneumonia is larger and needing iv antibiotics     41-year-old female with no significant past medical history presents for abnormal chest x-ray after recent admission for COVID-19 and pulmonary embolisms.  She initially tested positive for COVID-19 01/04/2022 and was seen at [facility] where she was treated with Decadron and azithromycin.  When her symptoms worsened, she return to [facility] and had a CTA which showed bilateral pulmonary embolisms.  Her workup showed no signs of heart strain and she was started on Eliquis for PE given 1 dose of IV Levaquin.  She had a follow-up appointment with her PCP yesterday and noted persistent productive cough, subjective fever, chills, shortness of breath and chest pain.  She had a chest x-ray which showed left lower lobe pneumonia and was instructed to come to the ED for evaluation.        Review of patient's allergies indicates:  No Known Allergies  History reviewed. No pertinent past medical history.  History reviewed. No pertinent surgical history.  Family History   Problem Relation Age of Onset    No Known Problems Mother     No Known Problems Father      Social History     Tobacco Use    Smoking status: Never Smoker    Smokeless tobacco: Never Used   Substance Use Topics    Alcohol use: Yes     Comment: socially    Drug use: Never     Review of Systems   Constitutional: Positive for chills, fatigue and fever.   HENT: Negative for sore throat.    Respiratory: Positive for cough and shortness of breath.    Cardiovascular: Negative for chest pain.   Gastrointestinal: Negative for nausea.   Genitourinary: Negative for dysuria.   Musculoskeletal: Negative for back pain.   Skin: Negative for rash.   Neurological: Negative for weakness.   Hematological: Does not bruise/bleed  easily.       Physical Exam     Initial Vitals [01/19/22 1613]   BP Pulse Resp Temp SpO2   139/80 103 20 99.4 °F (37.4 °C) 97 %      MAP       --         Physical Exam    Nursing note and vitals reviewed.  Constitutional: She appears well-developed and well-nourished.   HENT:   Head: Normocephalic and atraumatic.   Eyes: EOM are normal. Pupils are equal, round, and reactive to light.   Neck: Neck supple.   Normal range of motion.  Cardiovascular: Normal rate, regular rhythm, normal heart sounds and intact distal pulses. Exam reveals no gallop and no friction rub.    No murmur heard.  Pulmonary/Chest: Breath sounds normal. No respiratory distress. She has no wheezes. She has no rhonchi. She has no rales. She exhibits no tenderness.   Musculoskeletal:         General: Normal range of motion.      Cervical back: Normal range of motion and neck supple.     Neurological: She is alert and oriented to person, place, and time.   Skin: Skin is warm and dry.   Psychiatric: She has a normal mood and affect.         ED Course   Procedures  Labs Reviewed   CBC W/ AUTO DIFFERENTIAL - Abnormal; Notable for the following components:       Result Value    Hemoglobin 11.5 (*)     Hematocrit 35.0 (*)     MCV 81 (*)     MCH 26.6 (*)     RDW 14.7 (*)     Immature Granulocytes 0.6 (*)     Immature Grans (Abs) 0.06 (*)     Mono # 1.1 (*)     All other components within normal limits   COMPREHENSIVE METABOLIC PANEL - Abnormal; Notable for the following components:    Sodium 133 (*)     CO2 22 (*)     Albumin 3.1 (*)     All other components within normal limits   LACTIC ACID, PLASMA   PROCALCITONIN          Imaging Results          X-Ray Chest PA And Lateral (Final result)  Result time 01/19/22 18:10:25    Final result by Zenon Russell MD (01/19/22 18:10:25)                 Impression:      1. Findings concerning for developing consolidation projected over the left lower lung zone posteriorly.  Coarse interstitial attenuation could  reflect superimposed edema sequela of infection.  Correlation and follow-up is advised.      Electronically signed by: Zenon Russell MD  Date:    01/19/2022  Time:    18:10             Narrative:    EXAMINATION:  XR CHEST PA AND LATERAL    CLINICAL HISTORY:  Cough, unspecified    TECHNIQUE:  PA and lateral views of the chest were performed.    COMPARISON:  01/18/2022    FINDINGS:  The cardiomediastinal silhouette is not enlarged, stable..  There is obscuration of the left costophrenic angle, possibly reflecting layering effusion.  The trachea is midline.  The lungs are symmetrically expanded bilaterally with coarse interstitial attenuation in a perihilar distribution.  There is increased parenchymal attenuation projected over the left lower lung zone.  There is no pneumothorax.  The osseous structures are unremarkable.                                 Medications   levoFLOXacin 750 mg/150 mL IVPB 750 mg (750 mg Intravenous New Bag 1/19/22 1949)   lactated ringers bolus 1,000 mL (0 mLs Intravenous Stopped 1/19/22 1945)   acetaminophen tablet 1,000 mg (1,000 mg Oral Given 1/19/22 1813)   promethazine-codeine 6.25-10 mg/5 ml syrup 5 mL (5 mLs Oral Given 1/19/22 2010)     Medical Decision Making:   History:   Old Medical Records: I decided to obtain old medical records.  Old Records Summarized: records from another hospital and records from previous admission(s).       <> Summary of Records: Patient discharged 01/11/2022 after 1 day observation admission for bilateral PEs with no heart strain.  She received 1 dose of IV Levaquin during that admission.    Prior to this, she was seen at [facility] on 01/04/2022 in prescribe Decadron and azithromycin  Initial Assessment:   41-year-old female presenting for pneumonia on outpatient chest x-ray.  Vitals are normal, she appears uncomfortable but nontoxic.  Differential Diagnosis:   Suspect symptoms due to bacterial pneumonia superimposed on viral COVID-19  pneumonia  I do not think that this truly represents treatment failure as patient only received 1 dose of Levaquin and was not discharged with antibiotics  Not consistent with HCAP as patient was only admitted for 1 day  Dehydration  Electrolyte derangement    Independently Interpreted Test(s):   I have ordered and independently interpreted X-rays - see summary below.       <> Summary of X-Ray Reading(s): Left lower lobe consolidation, similar when compared to prior  I have ordered and independently interpreted EKG Reading(s) - see prior notes  Clinical Tests:   Lab Tests: Ordered and Reviewed  Radiological Study: Ordered and Reviewed  Medical Tests: Ordered and Reviewed  ED Management:  Will check labs, give fluids, medication for cough and repeat chest x-ray.    Lab workup is reassuring, no leukocytosis, procalcitonin is negative.  I discussed with the patient that I do not feel that inpatient treatment for IV antibiotics is indicated this time.  Will give dose of Levaquin in the ED and plan for discharge with p.o. Levaquin.    Patient voiced concern about efficacy of outpatient treatment as she was told that it would be best practice for to be treated with IV antibiotics.  Although I understand this concern, I do not feel that she meets any criteria for admission.  I discussed her case with hospitalist Dr. Sanders who also does not feel the patient requires admission at this time.  Discussed with ED attending Dr. Messina also evaluated the patient.  Will discharge with Levaquin and instruct her to follow up with PCP return to the ED for any worsening symptoms.  Other:   I have discussed this case with another health care provider.             ED Course as of 01/19/22 2108 Wed Jan 19, 2022 1856 WBC: 9.95 [CC]   1856 Lactate, Aditya: 1.1 [CC]   1912 Procalcitonin: 0.05 [CC]   1916 Procalcitonin: 0.05 [CC]   1953 I discussed this patient with hospitalist Dr. Sanders, no indication for admission for failed outpatient  treatment of pneumonia at this time. [CC]      ED Course User Index  [CC] Pearl Rodriguez PA-C             Clinical Impression:   Final diagnoses:  [R05.9] Cough  [R05.9] Cough - PNA vs pulm infacrt from PE  [J18.9] Pneumonia of left lower lobe due to infectious organism (Primary)          ED Disposition Condition    Discharge Stable        ED Prescriptions     Medication Sig Dispense Start Date End Date Auth. Provider    levoFLOXacin (LEVAQUIN) 250 MG tablet Take 3 tablets (750 mg total) by mouth once daily. for 5 days 15 tablet 1/19/2022 1/24/2022 Pearl Rodriguez PA-C    promethazine-dextromethorphan (PROMETHAZINE-DM) 6.25-15 mg/5 mL Syrp Take 5 mLs by mouth every 4 (four) hours as needed (cough). 473 mL 1/19/2022 1/29/2022 Pearl Rodriguez PA-C        Follow-up Information     Follow up With Specialties Details Why Contact Info Additional Information    [facility] Internal Medicine Schedule an appointment as soon as possible for a visit in 1 week  [address]  [facility] [address] [phone] [facility] Please park in surface lot and check in at central registration desk    [facility] Emergency Dept Emergency Medicine Go to  If symptoms worsen [address]  [facility] [id number] [phone]            Pearl Rodriguez PA-C  01/19/22 6039

## 2022-01-20 NOTE — DISCHARGE INSTRUCTIONS
Diagnosis:  Pneumonia    You have pneumonia your left lower lung.  You were given IV antibiotics in the emergency department and I am prescribing a course of antibiotics to help clear the infection.  I am also prescribing a different cough medicine to help your symptoms.    Please schedule a follow-up appointment with your primary care doctor.  Please continue to take your Eliquis as instructed.  If you start to have any worsening symptoms, please return to the emergency department.

## 2022-01-22 LAB
BACTERIA SPEC AEROBE CULT: ABNORMAL
BACTERIA SPEC AEROBE CULT: ABNORMAL
GRAM STN SPEC: ABNORMAL

## 2022-01-23 ENCOUNTER — PATIENT MESSAGE (OUTPATIENT)
Dept: INTERNAL MEDICINE | Facility: CLINIC | Age: 42
End: 2022-01-23
Payer: COMMERCIAL

## 2022-01-24 ENCOUNTER — OFFICE VISIT (OUTPATIENT)
Dept: HEMATOLOGY/ONCOLOGY | Facility: CLINIC | Age: 42
End: 2022-01-24
Payer: COMMERCIAL

## 2022-01-24 ENCOUNTER — TELEPHONE (OUTPATIENT)
Dept: INTERNAL MEDICINE | Facility: CLINIC | Age: 42
End: 2022-01-24
Payer: COMMERCIAL

## 2022-01-24 DIAGNOSIS — D64.9 MILD ANEMIA: ICD-10-CM

## 2022-01-24 DIAGNOSIS — I26.99 BILATERAL PULMONARY EMBOLISM: Primary | ICD-10-CM

## 2022-01-24 DIAGNOSIS — Z82.49 FAMILY HISTORY OF BLOOD CLOTS: ICD-10-CM

## 2022-01-24 PROCEDURE — 1159F MED LIST DOCD IN RCRD: CPT | Mod: CPTII,95,, | Performed by: STUDENT IN AN ORGANIZED HEALTH CARE EDUCATION/TRAINING PROGRAM

## 2022-01-24 PROCEDURE — 99203 OFFICE O/P NEW LOW 30 MIN: CPT | Mod: 95,,, | Performed by: STUDENT IN AN ORGANIZED HEALTH CARE EDUCATION/TRAINING PROGRAM

## 2022-01-24 PROCEDURE — 1159F PR MEDICATION LIST DOCUMENTED IN MEDICAL RECORD: ICD-10-PCS | Mod: CPTII,95,, | Performed by: STUDENT IN AN ORGANIZED HEALTH CARE EDUCATION/TRAINING PROGRAM

## 2022-01-24 PROCEDURE — 1160F RVW MEDS BY RX/DR IN RCRD: CPT | Mod: CPTII,95,, | Performed by: STUDENT IN AN ORGANIZED HEALTH CARE EDUCATION/TRAINING PROGRAM

## 2022-01-24 PROCEDURE — 1160F PR REVIEW ALL MEDS BY PRESCRIBER/CLIN PHARMACIST DOCUMENTED: ICD-10-PCS | Mod: CPTII,95,, | Performed by: STUDENT IN AN ORGANIZED HEALTH CARE EDUCATION/TRAINING PROGRAM

## 2022-01-24 PROCEDURE — 99203 PR OFFICE/OUTPT VISIT, NEW, LEVL III, 30-44 MIN: ICD-10-PCS | Mod: 95,,, | Performed by: STUDENT IN AN ORGANIZED HEALTH CARE EDUCATION/TRAINING PROGRAM

## 2022-01-24 RX ORDER — AMOXICILLIN AND CLAVULANATE POTASSIUM 875; 125 MG/1; MG/1
1 TABLET, FILM COATED ORAL EVERY 12 HOURS
Qty: 10 TABLET | Refills: 0 | Status: SHIPPED | OUTPATIENT
Start: 2022-01-24 | End: 2022-01-29

## 2022-01-24 NOTE — PROGRESS NOTES
Hematology- Oncology Clinic Note :      1/24/2022    RFV / chief complaint- Referral (PE)        HPI  Pt is a 41 y.o. female who  has a past medical history of Allergy.   Pt presents to the clinic today for PE    Had covid in Dec 2021, diagnosed with PNA on 1/4/22.   On Jan 11/2022 she went with chest pain when CTA showed b/l PE     C/o cough    Onset of symptoms was gradual starting a few weeks ago with gradually improving course since that time. Symptoms are mild in intensity.  Patient denies dizziness, sob, chest pain, leg swelling. Symptoms are aggravated by talking for prolonged time. Symptoms improve with cough syrup.     Appetite -decreased    No hx of miscarriages or prior DVT.   Mother with DVT   No hx of PUD or bleeding issues.     Reviewed past medical/surgical/social history    Past Medical History:   Diagnosis Date    Allergy       Past Surgical History:   Procedure Laterality Date    WISDOM TOOTH EXTRACTION      ?late 20s      Review of patient's allergies indicates:  No Known Allergies   Social History     Tobacco Use    Smoking status: Never Smoker    Smokeless tobacco: Never Used   Substance Use Topics    Alcohol use: Yes     Comment: socially      Family History   Problem Relation Age of Onset    Deep vein thrombosis Mother     No Known Problems Father     Breast cancer Maternal Aunt     Cancer Maternal Uncle           Review of Systems :  Review of Systems   Constitutional: Positive for malaise/fatigue. Negative for chills, diaphoresis, fever and weight loss.   HENT: Negative.  Negative for congestion, hearing loss, nosebleeds, sore throat and tinnitus.    Eyes: Negative.  Negative for blurred vision and discharge.   Respiratory: Negative for cough, hemoptysis, sputum production, shortness of breath and wheezing.    Cardiovascular: Negative.  Negative for chest pain, palpitations and leg swelling.   Gastrointestinal: Negative.  Negative for abdominal pain, blood in stool, constipation,  diarrhea, heartburn, melena, nausea and vomiting.   Genitourinary: Negative.    Musculoskeletal: Negative.  Negative for back pain, falls, joint pain and myalgias.   Skin: Negative.  Negative for itching and rash.   Neurological: Negative.  Negative for dizziness, tingling, sensory change, speech change, focal weakness, seizures, loss of consciousness, weakness and headaches.   Endo/Heme/Allergies: Negative.  Does not bruise/bleed easily.   Psychiatric/Behavioral: Negative.  Negative for depression. The patient is not nervous/anxious and does not have insomnia.                Physical Exam :  LMP 01/12/2022 (Exact Date)   Wt Readings from Last 3 Encounters:   01/19/22 95.7 kg (211 lb)   01/12/22 94.3 kg (208 lb)   12/23/21 90.7 kg (200 lb)       There is no height or weight on file to calculate BMI.      Physical Exam  Constitutional:       General: She is not in acute distress.     Appearance: Normal appearance. She is not ill-appearing.   HENT:      Head: Normocephalic and atraumatic.      Right Ear: External ear normal.      Left Ear: External ear normal.   Eyes:      General: No scleral icterus.        Right eye: No discharge.         Left eye: No discharge.   Pulmonary:      Effort: Pulmonary effort is normal. No respiratory distress.   Skin:     Coloration: Skin is not jaundiced.      Findings: No erythema or rash.   Neurological:      Mental Status: She is alert and oriented to person, place, and time. Mental status is at baseline.   Psychiatric:         Mood and Affect: Mood normal.         Speech: Speech normal.         Behavior: Behavior normal.             Current Outpatient Medications   Medication Sig Dispense Refill    apixaban (ELIQUIS) 5 mg Tab Take 2 tablets (10 mg total) by mouth 2 (two) times daily for first 7 days. Then start taking 1 tablet ( 5mg ) by mouth twice daily 74 tablet 0    benzonatate (TESSALON) 100 MG capsule Take 1 capsule (100 mg total) by mouth 3 (three) times daily as needed  for Cough. 30 capsule 1    levoFLOXacin (LEVAQUIN) 250 MG tablet Take 3 tablets (750 mg total) by mouth once daily. for 5 days 15 tablet 0    loratadine (CLARITIN) 10 mg tablet Take 1 tablet (10 mg total) by mouth once daily. 30 tablet 0    promethazine-dextromethorphan (PROMETHAZINE-DM) 6.25-15 mg/5 mL Syrp Take 5 mLs by mouth every 4 (four) hours as needed (cough). 473 mL 0     No current facility-administered medications for this visit.       Pertinent Diagnostic studies:      Admission on 01/19/2022, Discharged on 01/19/2022   Component Date Value Ref Range Status    WBC 01/19/2022 9.95  3.90 - 12.70 K/uL Final    RBC 01/19/2022 4.32  4.00 - 5.40 M/uL Final    Hemoglobin 01/19/2022 11.5* 12.0 - 16.0 g/dL Final    Hematocrit 01/19/2022 35.0* 37.0 - 48.5 % Final    MCV 01/19/2022 81* 82 - 98 fL Final    MCH 01/19/2022 26.6* 27.0 - 31.0 pg Final    MCHC 01/19/2022 32.9  32.0 - 36.0 g/dL Final    RDW 01/19/2022 14.7* 11.5 - 14.5 % Final    Platelets 01/19/2022 329  150 - 450 K/uL Final    MPV 01/19/2022 11.1  9.2 - 12.9 fL Final    Immature Granulocytes 01/19/2022 0.6* 0.0 - 0.5 % Final    Gran # (ANC) 01/19/2022 6.2  1.8 - 7.7 K/uL Final    Immature Grans (Abs) 01/19/2022 0.06* 0.00 - 0.04 K/uL Final    Comment: Mild elevation in immature granulocytes is non specific and   can be seen in a variety of conditions including stress response,   acute inflammation, trauma and pregnancy. Correlation with other   laboratory and clinical findings is essential.      Lymph # 01/19/2022 2.3  1.0 - 4.8 K/uL Final    Mono # 01/19/2022 1.1* 0.3 - 1.0 K/uL Final    Eos # 01/19/2022 0.2  0.0 - 0.5 K/uL Final    Baso # 01/19/2022 0.03  0.00 - 0.20 K/uL Final    nRBC 01/19/2022 0  0 /100 WBC Final    Gran % 01/19/2022 62.7  38.0 - 73.0 % Final    Lymph % 01/19/2022 23.2  18.0 - 48.0 % Final    Mono % 01/19/2022 11.4  4.0 - 15.0 % Final    Eosinophil % 01/19/2022 1.8  0.0 - 8.0 % Final    Basophil %  01/19/2022 0.3  0.0 - 1.9 % Final    Differential Method 01/19/2022 Automated   Final    Sodium 01/19/2022 133* 136 - 145 mmol/L Final    Potassium 01/19/2022 5.0  3.5 - 5.1 mmol/L Final    Chloride 01/19/2022 101  95 - 110 mmol/L Final    CO2 01/19/2022 22* 23 - 29 mmol/L Final    Glucose 01/19/2022 100  70 - 110 mg/dL Final    BUN 01/19/2022 7  6 - 20 mg/dL Final    Creatinine 01/19/2022 0.7  0.5 - 1.4 mg/dL Final    Calcium 01/19/2022 9.5  8.7 - 10.5 mg/dL Final    Total Protein 01/19/2022 8.0  6.0 - 8.4 g/dL Final    Albumin 01/19/2022 3.1* 3.5 - 5.2 g/dL Final    Total Bilirubin 01/19/2022 0.4  0.1 - 1.0 mg/dL Final    Comment: For infants and newborns, interpretation of results should be based  on gestational age, weight and in agreement with clinical  observations.    Premature Infant recommended reference ranges:  Up to 24 hours.............<8.0 mg/dL  Up to 48 hours............<12.0 mg/dL  3-5 days..................<15.0 mg/dL  6-29 days.................<15.0 mg/dL      Alkaline Phosphatase 01/19/2022 78  55 - 135 U/L Final    AST 01/19/2022 23  10 - 40 U/L Final    *Result may be interfered by visible hemolysis    ALT 01/19/2022 19  10 - 44 U/L Final    Anion Gap 01/19/2022 10  8 - 16 mmol/L Final    eGFR if African American 01/19/2022 >60.0  >60 mL/min/1.73 m^2 Final    eGFR if non African American 01/19/2022 >60.0  >60 mL/min/1.73 m^2 Final    Comment: Calculation used to obtain the estimated glomerular filtration  rate (eGFR) is the CKD-EPI equation.       Lactate (Lactic Acid) 01/19/2022 1.1  0.5 - 2.2 mmol/L Final    Comment: Falsely low lactic acid results can be found in samples   containing >=13.0 mg/dL total bilirubin and/or >=3.5 mg/dL   direct bilirubin.      Procalcitonin 01/19/2022 0.05  <0.25 ng/mL Final    Comment: A concentration < 0.25 ng/mL represents a low risk of bacterial   infection.  Procalcitonin may not be accurate among patients with localized   infection,  recent trauma or major surgery, immunosuppressed state,   invasive fungal infection, renal dysfunction. Decisions regarding   initiation or continuation of antibiotic therapy should not be based   solely on procalcitonin levels.             Oncology History    No history exists.     Assessment :   ECOG 1    1. Bilateral pulmonary embolism    2. Family history of blood clots    3. Mild anemia        Plan :     Bilateral PE in the setting of covid infection   CTA chest 1/11/22- . Bilateral pulmonary emboli as detailed above.  No evidence of right heart strain.2. Numerous small nodular and ground-glass opacities in the bilateral lungs, likely sequela of resolving multifocal pneumonia.  US 1/12/22 No evidence of deep venous thrombosis in either lower extremity noting calf vessels were note evaluated.  Screening MMG recd. Mother with DVT, no known hypercoag disorder.  hypercoag work up will not , hence defer work up, pt agreeable.   Recd anticoagulation for 3 to 6 months. Safety while on anticaog and ER precautions d/w pt.   RTC 3 months    Mild anemia- will check cbc next time, if still anemia, will order work up    The patient location is: [place]  The chief complaint leading to consultation is: see chief complaint below    Visit type: audiovisual    Face to Face time with patient: 12  30minutes of total time spent on the encounter, which includes face to face time and non-face to face time preparing to see the patient (eg, review of tests), Obtaining and/or reviewing separately obtained history, Documenting clinical information in the electronic or other health record, Independently interpreting results (not separately reported) and communicating results to the patient/family/caregiver, or Care coordination (not separately reported).         Each patient to whom he or she provides medical services by telemedicine is:  (1) informed of the relationship between the physician and patient and the  respective role of any other health care provider with respect to management of the patient; and (2) notified that he or she may decline to receive medical services by telemedicine and may withdraw from such care at any time.    Electronically signed by Elisabeth Braxton    [facility] Future Appointments   Date Time Provider Department Center   3/28/2022 10:00 AM Romario Cutler MD [facility]   3/30/2022  9:00 AM [facility]           This note was created with voice recognition software.  Grammatical, syntax and spelling errors may be inevitable.

## 2022-02-14 ENCOUNTER — OFFICE VISIT (OUTPATIENT)
Dept: INTERNAL MEDICINE | Facility: CLINIC | Age: 42
End: 2022-02-14
Payer: COMMERCIAL

## 2022-02-14 ENCOUNTER — HOSPITAL ENCOUNTER (OUTPATIENT)
Dept: RADIOLOGY | Facility: HOSPITAL | Age: 42
Discharge: HOME | End: 2022-02-14
Attending: NURSE PRACTITIONER
Payer: COMMERCIAL

## 2022-02-14 ENCOUNTER — PATIENT MESSAGE (OUTPATIENT)
Dept: INTERNAL MEDICINE | Facility: CLINIC | Age: 42
End: 2022-02-14

## 2022-02-14 VITALS
BODY MASS INDEX: 39.6 KG/M2 | DIASTOLIC BLOOD PRESSURE: 62 MMHG | HEIGHT: 62 IN | WEIGHT: 215.19 LBS | SYSTOLIC BLOOD PRESSURE: 106 MMHG | HEART RATE: 99 BPM

## 2022-02-14 DIAGNOSIS — I26.99 BILATERAL PULMONARY EMBOLISM: ICD-10-CM

## 2022-02-14 DIAGNOSIS — E66.9 OBESITY (BMI 35.0-39.9 WITHOUT COMORBIDITY): ICD-10-CM

## 2022-02-14 DIAGNOSIS — Z86.16 HISTORY OF COVID-19: ICD-10-CM

## 2022-02-14 DIAGNOSIS — J18.9 MULTIFOCAL PNEUMONIA: ICD-10-CM

## 2022-02-14 DIAGNOSIS — J18.9 MULTIFOCAL PNEUMONIA: Primary | ICD-10-CM

## 2022-02-14 PROCEDURE — 99999 PR PBB SHADOW E&M-EST. PATIENT-LVL IV: ICD-10-PCS | Mod: PBBFAC,,, | Performed by: NURSE PRACTITIONER

## 2022-02-14 PROCEDURE — 3008F PR BODY MASS INDEX (BMI) DOCUMENTED: ICD-10-PCS | Mod: CPTII,S$GLB,, | Performed by: NURSE PRACTITIONER

## 2022-02-14 PROCEDURE — 99214 PR OFFICE/OUTPT VISIT, EST, LEVL IV, 30-39 MIN: ICD-10-PCS | Mod: S$GLB,,, | Performed by: NURSE PRACTITIONER

## 2022-02-14 PROCEDURE — 3008F BODY MASS INDEX DOCD: CPT | Mod: CPTII,S$GLB,, | Performed by: NURSE PRACTITIONER

## 2022-02-14 PROCEDURE — 71046 X-RAY EXAM CHEST 2 VIEWS: CPT | Mod: 26,,, | Performed by: RADIOLOGY

## 2022-02-14 PROCEDURE — 71046 XR CHEST PA AND LATERAL: ICD-10-PCS | Mod: 26,,, | Performed by: RADIOLOGY

## 2022-02-14 PROCEDURE — 71046 X-RAY EXAM CHEST 2 VIEWS: CPT | Mod: TC

## 2022-02-14 PROCEDURE — 99999 PR PBB SHADOW E&M-EST. PATIENT-LVL IV: CPT | Mod: PBBFAC,,, | Performed by: NURSE PRACTITIONER

## 2022-02-14 PROCEDURE — 1159F PR MEDICATION LIST DOCUMENTED IN MEDICAL RECORD: ICD-10-PCS | Mod: CPTII,S$GLB,, | Performed by: NURSE PRACTITIONER

## 2022-02-14 PROCEDURE — 1159F MED LIST DOCD IN RCRD: CPT | Mod: CPTII,S$GLB,, | Performed by: NURSE PRACTITIONER

## 2022-02-14 PROCEDURE — 3074F SYST BP LT 130 MM HG: CPT | Mod: CPTII,S$GLB,, | Performed by: NURSE PRACTITIONER

## 2022-02-14 PROCEDURE — 3074F PR MOST RECENT SYSTOLIC BLOOD PRESSURE < 130 MM HG: ICD-10-PCS | Mod: CPTII,S$GLB,, | Performed by: NURSE PRACTITIONER

## 2022-02-14 PROCEDURE — 3078F DIAST BP <80 MM HG: CPT | Mod: CPTII,S$GLB,, | Performed by: NURSE PRACTITIONER

## 2022-02-14 PROCEDURE — 3078F PR MOST RECENT DIASTOLIC BLOOD PRESSURE < 80 MM HG: ICD-10-PCS | Mod: CPTII,S$GLB,, | Performed by: NURSE PRACTITIONER

## 2022-02-14 PROCEDURE — 99214 OFFICE O/P EST MOD 30 MIN: CPT | Mod: S$GLB,,, | Performed by: NURSE PRACTITIONER

## 2022-02-14 NOTE — LETTER
February 14, 2022      [facility]  [address]  [facility] [address]  Phone: [phone]  Fax: [phone]       Patient: Gayatri Henderson   YOB: 1980  Date of Visit: 02/14/2022    To Whom It May Concern:    Dary Henderson  was at [facility] on 02/14/2022. The patient may return to work/school on 2- with restrictions. If you have any questions or concerns, or if I can be of further assistance, please do not hesitate to contact me.    Sincerely,    Rosalia Martinez DNP

## 2022-02-14 NOTE — PATIENT INSTRUCTIONS
Recheck Chest Xray today, will message with results    Refilled Eliquis for 90 more days, take 2 times a day    Keep follow up with Hematology as scheduled 4-25-22 with  Dr. Braxton    Work note given    Keep follow up on 3-30-22 with Dr. Martino to establish PCP as scheduled

## 2022-02-17 NOTE — ED NOTES
Ambulatory  o2 sat performed noting 96-97% on ra. At rest 98%. Patient did become tachycardic 110-115 beats/min. And states that the chest pain became worse with ambulation. Given tylenol and motrin for CP prior. No resp distress noted but notable pain response.      
Bed: Sort 03  Expected date:   Expected time:   Means of arrival:   Comments:  NOHD  
Dr Jones at bedside to discuss discharge.  
Patient care assumed in ED #3 - patient complains of consistant left-sided chest pain deep and sharp in nature without radiation - worse on movement and deep breaths - patient states she was Covid positive on 12.23.2021 and was diagnosed with pneumonia on 1.4.2022 - patient completed steroid prescription and Z-pack - tested negative for Covid yesterday - cough productive of white phlegm without fevers     No obvious SOB noted - lungs clear and equal bilaterally    
Patient given tylenol and motrin for reported chest pain with breathing  
Patient identifiers verified and correct for patient  C/C: CP- dx BL PE  APPEARANCE: awake and alert in moderate pain which is intermittent in nature  SKIN: warm, dry and intact. No breakdown or bruising.  MUSCULOSKELETAL: Patient moving all extremities spontaneously, no obvious swelling or deformities noted. Ambulates independently but has hesitation d/t cp with movement.  RESPIRATORY: Denies shortness of breath.Respirations unlabored. Clear bs upon auscultation  CARDIAC: CP w/ breathing, + distal pulses; no peripheral edema  ABDOMEN: denies abdominal pain and n/v/d   : voids spontaneously, denies difficulty  Neurologic: AAO x 4; follows commands equal strength in all extremities; denies numbness/tingling. Denies dizziness     
Pt started menstrual cycle. RN provided pt with pads and disposable underwear.     Received report from CHARLIE Arciniega  Care assumed.   
Relieving primary RN for lunch. Pt awaiting admit team to come down with dc instructions. Pt verbalized understanding.   
SPoke with CT about delay - patient should be going in the next few minutes    
Xray Chest 1 View-PORTABLE IMMEDIATE

## 2022-03-27 PROBLEM — E66.01 CLASS 2 SEVERE OBESITY DUE TO EXCESS CALORIES WITH SERIOUS COMORBIDITY AND BODY MASS INDEX (BMI) OF 38.0 TO 38.9 IN ADULT: Status: ACTIVE | Noted: 2022-03-27

## 2022-03-27 PROBLEM — U07.1 COVID-19: Status: ACTIVE | Noted: 2022-03-27

## 2022-03-27 PROBLEM — E66.812 CLASS 2 SEVERE OBESITY DUE TO EXCESS CALORIES WITH SERIOUS COMORBIDITY AND BODY MASS INDEX (BMI) OF 38.0 TO 38.9 IN ADULT: Status: ACTIVE | Noted: 2022-03-27

## 2022-03-27 NOTE — PROGRESS NOTES
Subjective:   Patient ID:  Gayatri Henderson is a 41 y.o. female who presents for evaluation of PE in January    HPI: She had COVID in late 2021, and PE in early 2022 The patient has no chest pain, SOB, TIA, palpitations, syncope or pre-syncope.Patient does  Exercise lately and trying to lose weight.She prefers the full dose Eliquis for a year and probably switch to lower dose next year.        Review of Systems   Constitutional: Negative for chills, decreased appetite, diaphoresis, fever, malaise/fatigue, night sweats, weight gain and weight loss.   HENT: Negative for congestion, hoarse voice, nosebleeds, sore throat and tinnitus.    Eyes: Negative for blurred vision, double vision, vision loss in left eye, vision loss in right eye, visual disturbance and visual halos.   Cardiovascular: Negative for chest pain, claudication, cyanosis, dyspnea on exertion, irregular heartbeat, leg swelling, near-syncope, orthopnea, palpitations, paroxysmal nocturnal dyspnea and syncope.   Respiratory: Negative for cough, hemoptysis, shortness of breath, sleep disturbances due to breathing, snoring, sputum production and wheezing.    Endocrine: Negative for cold intolerance, heat intolerance, polydipsia, polyphagia and polyuria.   Hematologic/Lymphatic: Negative for adenopathy and bleeding problem. Does not bruise/bleed easily.   Skin: Negative for color change, dry skin, flushing, itching, nail changes, poor wound healing, rash, skin cancer, suspicious lesions and unusual hair distribution.   Musculoskeletal: Negative for arthritis, back pain, falls, gout, joint pain, joint swelling, muscle cramps, muscle weakness, myalgias and stiffness.   Gastrointestinal: Negative for abdominal pain, anorexia, change in bowel habit, constipation, diarrhea, dysphagia, heartburn, hematemesis, hematochezia, melena and vomiting.   Genitourinary: Negative for decreased libido, dysuria, hematuria, hesitancy and urgency.   Neurological: Negative  "for excessive daytime sleepiness, dizziness, focal weakness, headaches, light-headedness, loss of balance, numbness, paresthesias, seizures, sensory change, tremors, vertigo and weakness.   Psychiatric/Behavioral: Negative for altered mental status, depression, hallucinations, memory loss, substance abuse and suicidal ideas. The patient does not have insomnia and is not nervous/anxious.    Allergic/Immunologic: Negative for environmental allergies and hives.       Objective: BP (!) 120/59 (BP Location: Left arm, Patient Position: Sitting, BP Method: Medium (Automatic))   Pulse 81   Ht 5' 2" (1.575 m)   Wt 96.7 kg (213 lb 3 oz)   BMI 38.99 kg/m²      Physical Exam  Constitutional:       Appearance: She is well-developed.   HENT:      Head: Normocephalic.   Eyes:      Pupils: Pupils are equal, round, and reactive to light.   Neck:      Thyroid: No thyromegaly.      Vascular: Normal carotid pulses. No carotid bruit, hepatojugular reflux or JVD.   Cardiovascular:      Rate and Rhythm: Normal rate and regular rhythm.      Pulses: Intact distal pulses.      Heart sounds: Normal heart sounds. No murmur heard.    No friction rub. No gallop.   Pulmonary:      Effort: Pulmonary effort is normal. No tachypnea or respiratory distress.      Breath sounds: Normal breath sounds. No wheezing or rales.   Chest:      Chest wall: No tenderness.   Abdominal:      General: Bowel sounds are normal. There is no distension.      Palpations: Abdomen is soft. There is no mass.      Tenderness: There is no abdominal tenderness. There is no guarding or rebound.   Musculoskeletal:         General: No tenderness. Normal range of motion.      Cervical back: Normal range of motion.   Lymphadenopathy:      Cervical: No cervical adenopathy.   Skin:     General: Skin is warm.      Findings: No erythema or rash.   Neurological:      Mental Status: She is alert and oriented to person, place, and time.      Cranial Nerves: No cranial nerve deficit. "      Coordination: Coordination normal.   Psychiatric:         Behavior: Behavior normal.         Thought Content: Thought content normal.         Judgment: Judgment normal.         Assessment:     1. Bilateral pulmonary embolism    2. Chest pain on exertion    3. Multifocal pneumonia    4. Class 2 severe obesity due to excess calories with serious comorbidity and body mass index (BMI) of 38.0 to 38.9 in adult    5. COVID-19    6. Chronic anticoagulation        Plan:   Discussed diet , achieving and maintaining ideal body weight, and exercise.   We reviewed meds in detail.  Reassured-Discussed goals, options, plan.  I would favor Full anti-coag for at least total of 6 months but with persistent severe obesity, I would favor 9-12 months and consideration could be given to long term low dose suppression.  No lipids in our system  She prefers the full dose Eliquis for a year and probably switch to lower dose next year.  Omega-3 > 800 mg/d combined EPA/DHA.      Gayatri was seen today for establish care.    Diagnoses and all orders for this visit:    Bilateral pulmonary embolism  -     Ambulatory referral/consult to Cardiology    Chest pain on exertion    Multifocal pneumonia    Class 2 severe obesity due to excess calories with serious comorbidity and body mass index (BMI) of 38.0 to 38.9 in adult    COVID-19    Chronic anticoagulation            Follow up in about 9 months (around 12/28/2022) for See me 9 months; labs today.

## 2022-03-28 ENCOUNTER — LAB VISIT (OUTPATIENT)
Dept: LAB | Facility: HOSPITAL | Age: 42
End: 2022-03-28
Attending: INTERNAL MEDICINE
Payer: COMMERCIAL

## 2022-03-28 ENCOUNTER — OFFICE VISIT (OUTPATIENT)
Dept: CARDIOLOGY | Facility: CLINIC | Age: 42
End: 2022-03-28
Payer: COMMERCIAL

## 2022-03-28 VITALS
SYSTOLIC BLOOD PRESSURE: 120 MMHG | WEIGHT: 213.19 LBS | BODY MASS INDEX: 39.23 KG/M2 | DIASTOLIC BLOOD PRESSURE: 59 MMHG | HEIGHT: 62 IN | HEART RATE: 81 BPM

## 2022-03-28 DIAGNOSIS — E66.01 CLASS 2 SEVERE OBESITY DUE TO EXCESS CALORIES WITH SERIOUS COMORBIDITY AND BODY MASS INDEX (BMI) OF 38.0 TO 38.9 IN ADULT: ICD-10-CM

## 2022-03-28 DIAGNOSIS — I26.99 BILATERAL PULMONARY EMBOLISM: Primary | ICD-10-CM

## 2022-03-28 DIAGNOSIS — J18.9 MULTIFOCAL PNEUMONIA: ICD-10-CM

## 2022-03-28 DIAGNOSIS — U07.1 COVID-19: ICD-10-CM

## 2022-03-28 DIAGNOSIS — I26.99 BILATERAL PULMONARY EMBOLISM: ICD-10-CM

## 2022-03-28 DIAGNOSIS — Z79.01 CHRONIC ANTICOAGULATION: ICD-10-CM

## 2022-03-28 DIAGNOSIS — Z86.16 HISTORY OF COVID-19: ICD-10-CM

## 2022-03-28 DIAGNOSIS — R07.9 CHEST PAIN ON EXERTION: ICD-10-CM

## 2022-03-28 LAB
ALBUMIN SERPL BCP-MCNC: 3.8 G/DL (ref 3.5–5.2)
ALP SERPL-CCNC: 77 U/L (ref 55–135)
ALT SERPL W/O P-5'-P-CCNC: 19 U/L (ref 10–44)
ANION GAP SERPL CALC-SCNC: 7 MMOL/L (ref 8–16)
AST SERPL-CCNC: 17 U/L (ref 10–40)
BASOPHILS # BLD AUTO: 0.02 K/UL (ref 0–0.2)
BASOPHILS NFR BLD: 0.3 % (ref 0–1.9)
BILIRUB SERPL-MCNC: 0.5 MG/DL (ref 0.1–1)
BUN SERPL-MCNC: 9 MG/DL (ref 6–20)
CALCIUM SERPL-MCNC: 10 MG/DL (ref 8.7–10.5)
CHLORIDE SERPL-SCNC: 103 MMOL/L (ref 95–110)
CHOLEST SERPL-MCNC: 165 MG/DL (ref 120–199)
CHOLEST/HDLC SERPL: 4.7 {RATIO} (ref 2–5)
CO2 SERPL-SCNC: 30 MMOL/L (ref 23–29)
CREAT SERPL-MCNC: 0.8 MG/DL (ref 0.5–1.4)
CRP SERPL-MCNC: 14.93 MG/L (ref 0–3.19)
DIFFERENTIAL METHOD: ABNORMAL
EOSINOPHIL # BLD AUTO: 0.2 K/UL (ref 0–0.5)
EOSINOPHIL NFR BLD: 2.5 % (ref 0–8)
ERYTHROCYTE [DISTWIDTH] IN BLOOD BY AUTOMATED COUNT: 15.2 % (ref 11.5–14.5)
EST. GFR  (AFRICAN AMERICAN): >60 ML/MIN/1.73 M^2
EST. GFR  (NON AFRICAN AMERICAN): >60 ML/MIN/1.73 M^2
GLUCOSE SERPL-MCNC: 89 MG/DL (ref 70–110)
HCT VFR BLD AUTO: 36.7 % (ref 37–48.5)
HDLC SERPL-MCNC: 35 MG/DL (ref 40–75)
HDLC SERPL: 21.2 % (ref 20–50)
HGB BLD-MCNC: 12.2 G/DL (ref 12–16)
IMM GRANULOCYTES # BLD AUTO: 0.02 K/UL (ref 0–0.04)
IMM GRANULOCYTES NFR BLD AUTO: 0.3 % (ref 0–0.5)
LDLC SERPL CALC-MCNC: 109 MG/DL (ref 63–159)
LYMPHOCYTES # BLD AUTO: 2.4 K/UL (ref 1–4.8)
LYMPHOCYTES NFR BLD: 37.5 % (ref 18–48)
MCH RBC QN AUTO: 26.7 PG (ref 27–31)
MCHC RBC AUTO-ENTMCNC: 33.2 G/DL (ref 32–36)
MCV RBC AUTO: 80 FL (ref 82–98)
MONOCYTES # BLD AUTO: 0.6 K/UL (ref 0.3–1)
MONOCYTES NFR BLD: 9.3 % (ref 4–15)
NEUTROPHILS # BLD AUTO: 3.2 K/UL (ref 1.8–7.7)
NEUTROPHILS NFR BLD: 50.1 % (ref 38–73)
NONHDLC SERPL-MCNC: 130 MG/DL
NRBC BLD-RTO: 0 /100 WBC
PLATELET # BLD AUTO: 407 K/UL (ref 150–450)
PMV BLD AUTO: 11.2 FL (ref 9.2–12.9)
POTASSIUM SERPL-SCNC: 4.1 MMOL/L (ref 3.5–5.1)
PROT SERPL-MCNC: 7.9 G/DL (ref 6–8.4)
RBC # BLD AUTO: 4.57 M/UL (ref 4–5.4)
SODIUM SERPL-SCNC: 140 MMOL/L (ref 136–145)
TRIGL SERPL-MCNC: 105 MG/DL (ref 30–150)
TSH SERPL DL<=0.005 MIU/L-ACNC: 1.63 UIU/ML (ref 0.4–4)
WBC # BLD AUTO: 6.42 K/UL (ref 3.9–12.7)

## 2022-03-28 PROCEDURE — 3078F PR MOST RECENT DIASTOLIC BLOOD PRESSURE < 80 MM HG: ICD-10-PCS | Mod: CPTII,S$GLB,, | Performed by: INTERNAL MEDICINE

## 2022-03-28 PROCEDURE — 1160F RVW MEDS BY RX/DR IN RCRD: CPT | Mod: CPTII,S$GLB,, | Performed by: INTERNAL MEDICINE

## 2022-03-28 PROCEDURE — 3078F DIAST BP <80 MM HG: CPT | Mod: CPTII,S$GLB,, | Performed by: INTERNAL MEDICINE

## 2022-03-28 PROCEDURE — 80053 COMPREHEN METABOLIC PANEL: CPT | Performed by: INTERNAL MEDICINE

## 2022-03-28 PROCEDURE — 99214 OFFICE O/P EST MOD 30 MIN: CPT | Mod: S$GLB,,, | Performed by: INTERNAL MEDICINE

## 2022-03-28 PROCEDURE — 99999 PR PBB SHADOW E&M-EST. PATIENT-LVL IV: CPT | Mod: PBBFAC,,, | Performed by: INTERNAL MEDICINE

## 2022-03-28 PROCEDURE — 3008F PR BODY MASS INDEX (BMI) DOCUMENTED: ICD-10-PCS | Mod: CPTII,S$GLB,, | Performed by: INTERNAL MEDICINE

## 2022-03-28 PROCEDURE — 99999 PR PBB SHADOW E&M-EST. PATIENT-LVL IV: ICD-10-PCS | Mod: PBBFAC,,, | Performed by: INTERNAL MEDICINE

## 2022-03-28 PROCEDURE — 99214 PR OFFICE/OUTPT VISIT, EST, LEVL IV, 30-39 MIN: ICD-10-PCS | Mod: S$GLB,,, | Performed by: INTERNAL MEDICINE

## 2022-03-28 PROCEDURE — 83695 ASSAY OF LIPOPROTEIN(A): CPT | Performed by: INTERNAL MEDICINE

## 2022-03-28 PROCEDURE — 3074F PR MOST RECENT SYSTOLIC BLOOD PRESSURE < 130 MM HG: ICD-10-PCS | Mod: CPTII,S$GLB,, | Performed by: INTERNAL MEDICINE

## 2022-03-28 PROCEDURE — 86141 C-REACTIVE PROTEIN HS: CPT | Performed by: INTERNAL MEDICINE

## 2022-03-28 PROCEDURE — 3008F BODY MASS INDEX DOCD: CPT | Mod: CPTII,S$GLB,, | Performed by: INTERNAL MEDICINE

## 2022-03-28 PROCEDURE — 1160F PR REVIEW ALL MEDS BY PRESCRIBER/CLIN PHARMACIST DOCUMENTED: ICD-10-PCS | Mod: CPTII,S$GLB,, | Performed by: INTERNAL MEDICINE

## 2022-03-28 PROCEDURE — 1159F PR MEDICATION LIST DOCUMENTED IN MEDICAL RECORD: ICD-10-PCS | Mod: CPTII,S$GLB,, | Performed by: INTERNAL MEDICINE

## 2022-03-28 PROCEDURE — 3074F SYST BP LT 130 MM HG: CPT | Mod: CPTII,S$GLB,, | Performed by: INTERNAL MEDICINE

## 2022-03-28 PROCEDURE — 84443 ASSAY THYROID STIM HORMONE: CPT | Performed by: INTERNAL MEDICINE

## 2022-03-28 PROCEDURE — 80061 LIPID PANEL: CPT | Performed by: INTERNAL MEDICINE

## 2022-03-28 PROCEDURE — 1159F MED LIST DOCD IN RCRD: CPT | Mod: CPTII,S$GLB,, | Performed by: INTERNAL MEDICINE

## 2022-03-28 PROCEDURE — 85025 COMPLETE CBC W/AUTO DIFF WBC: CPT | Performed by: INTERNAL MEDICINE

## 2022-03-28 NOTE — PATIENT INSTRUCTIONS
Discussed diet , achieving and maintaining ideal body weight, and exercise.   We reviewed meds in detail.  Reassured-Discussed goals, options, plan.  I would favor Full anti-coag for at least total of 6 months but with persistent severe obesity, I would favor 9-12 months and consideration could be given to long term low dose suppression.  No lipids in our system  She prefers the full dose Eliquis for a year and probably switch to lower dose next year.  Omega-3 > 800 mg/d combined EPA/DHA.

## 2022-03-31 ENCOUNTER — OFFICE VISIT (OUTPATIENT)
Dept: INTERNAL MEDICINE | Facility: CLINIC | Age: 42
End: 2022-03-31
Payer: COMMERCIAL

## 2022-03-31 VITALS
WEIGHT: 215.63 LBS | DIASTOLIC BLOOD PRESSURE: 70 MMHG | HEIGHT: 62 IN | SYSTOLIC BLOOD PRESSURE: 110 MMHG | HEART RATE: 95 BPM | OXYGEN SATURATION: 97 % | BODY MASS INDEX: 39.68 KG/M2

## 2022-03-31 DIAGNOSIS — Z76.89 ENCOUNTER TO ESTABLISH CARE WITH NEW DOCTOR: Primary | ICD-10-CM

## 2022-03-31 DIAGNOSIS — E66.01 CLASS 2 SEVERE OBESITY DUE TO EXCESS CALORIES WITH SERIOUS COMORBIDITY AND BODY MASS INDEX (BMI) OF 38.0 TO 38.9 IN ADULT: ICD-10-CM

## 2022-03-31 DIAGNOSIS — I26.99 BILATERAL PULMONARY EMBOLISM: ICD-10-CM

## 2022-03-31 DIAGNOSIS — Z01.419 WELL WOMAN EXAM: ICD-10-CM

## 2022-03-31 DIAGNOSIS — U07.1 COVID-19: ICD-10-CM

## 2022-03-31 DIAGNOSIS — E78.2 MIXED DYSLIPIDEMIA: ICD-10-CM

## 2022-03-31 DIAGNOSIS — Z12.31 ENCOUNTER FOR SCREENING MAMMOGRAM FOR MALIGNANT NEOPLASM OF BREAST: ICD-10-CM

## 2022-03-31 PROBLEM — K59.00 CONSTIPATION: Status: RESOLVED | Noted: 2022-01-11 | Resolved: 2022-03-31

## 2022-03-31 PROBLEM — R07.9 CHEST PAIN ON EXERTION: Status: RESOLVED | Noted: 2022-01-11 | Resolved: 2022-03-31

## 2022-03-31 PROBLEM — D72.829 LEUKOCYTOSIS: Status: RESOLVED | Noted: 2022-01-11 | Resolved: 2022-03-31

## 2022-03-31 PROBLEM — J18.9 MULTIFOCAL PNEUMONIA: Status: RESOLVED | Noted: 2022-01-11 | Resolved: 2022-03-31

## 2022-03-31 LAB — LPA SERPL-MCNC: 43 MG/DL (ref 0–30)

## 2022-03-31 PROCEDURE — 99999 PR PBB SHADOW E&M-EST. PATIENT-LVL IV: CPT | Mod: PBBFAC,,, | Performed by: STUDENT IN AN ORGANIZED HEALTH CARE EDUCATION/TRAINING PROGRAM

## 2022-03-31 PROCEDURE — 1159F PR MEDICATION LIST DOCUMENTED IN MEDICAL RECORD: ICD-10-PCS | Mod: CPTII,S$GLB,, | Performed by: STUDENT IN AN ORGANIZED HEALTH CARE EDUCATION/TRAINING PROGRAM

## 2022-03-31 PROCEDURE — 99214 OFFICE O/P EST MOD 30 MIN: CPT | Mod: S$GLB,,, | Performed by: STUDENT IN AN ORGANIZED HEALTH CARE EDUCATION/TRAINING PROGRAM

## 2022-03-31 PROCEDURE — 3078F PR MOST RECENT DIASTOLIC BLOOD PRESSURE < 80 MM HG: ICD-10-PCS | Mod: CPTII,S$GLB,, | Performed by: STUDENT IN AN ORGANIZED HEALTH CARE EDUCATION/TRAINING PROGRAM

## 2022-03-31 PROCEDURE — 3078F DIAST BP <80 MM HG: CPT | Mod: CPTII,S$GLB,, | Performed by: STUDENT IN AN ORGANIZED HEALTH CARE EDUCATION/TRAINING PROGRAM

## 2022-03-31 PROCEDURE — 3074F PR MOST RECENT SYSTOLIC BLOOD PRESSURE < 130 MM HG: ICD-10-PCS | Mod: CPTII,S$GLB,, | Performed by: STUDENT IN AN ORGANIZED HEALTH CARE EDUCATION/TRAINING PROGRAM

## 2022-03-31 PROCEDURE — 99214 PR OFFICE/OUTPT VISIT, EST, LEVL IV, 30-39 MIN: ICD-10-PCS | Mod: S$GLB,,, | Performed by: STUDENT IN AN ORGANIZED HEALTH CARE EDUCATION/TRAINING PROGRAM

## 2022-03-31 PROCEDURE — 99999 PR PBB SHADOW E&M-EST. PATIENT-LVL IV: ICD-10-PCS | Mod: PBBFAC,,, | Performed by: STUDENT IN AN ORGANIZED HEALTH CARE EDUCATION/TRAINING PROGRAM

## 2022-03-31 PROCEDURE — 1159F MED LIST DOCD IN RCRD: CPT | Mod: CPTII,S$GLB,, | Performed by: STUDENT IN AN ORGANIZED HEALTH CARE EDUCATION/TRAINING PROGRAM

## 2022-03-31 PROCEDURE — 3008F BODY MASS INDEX DOCD: CPT | Mod: CPTII,S$GLB,, | Performed by: STUDENT IN AN ORGANIZED HEALTH CARE EDUCATION/TRAINING PROGRAM

## 2022-03-31 PROCEDURE — 1160F RVW MEDS BY RX/DR IN RCRD: CPT | Mod: CPTII,S$GLB,, | Performed by: STUDENT IN AN ORGANIZED HEALTH CARE EDUCATION/TRAINING PROGRAM

## 2022-03-31 PROCEDURE — 3008F PR BODY MASS INDEX (BMI) DOCUMENTED: ICD-10-PCS | Mod: CPTII,S$GLB,, | Performed by: STUDENT IN AN ORGANIZED HEALTH CARE EDUCATION/TRAINING PROGRAM

## 2022-03-31 PROCEDURE — 1160F PR REVIEW ALL MEDS BY PRESCRIBER/CLIN PHARMACIST DOCUMENTED: ICD-10-PCS | Mod: CPTII,S$GLB,, | Performed by: STUDENT IN AN ORGANIZED HEALTH CARE EDUCATION/TRAINING PROGRAM

## 2022-03-31 PROCEDURE — 3074F SYST BP LT 130 MM HG: CPT | Mod: CPTII,S$GLB,, | Performed by: STUDENT IN AN ORGANIZED HEALTH CARE EDUCATION/TRAINING PROGRAM

## 2022-03-31 NOTE — PATIENT INSTRUCTIONS
--take a multivitamin that contains B12 and Vitamin D.    Getting started when you want to lose weight:     Start by increasing your exercise level to 4-5 times per week. A mixture of activities is better than 1 alone (biking, walking, running, lifting weights, floor workout, tennis, soccer), and the one that works is the one you enjoy. Finding a sport you love is the first big step towards weight loss.   Focus on portion control and eating a variety of mostly-green foods. A standard portion size of rice is 1/2 cup. Meat should be kept to 3-4 oz per meal. Fill up your plate with fruits and vegetables. No diet fits for everybody, and fad diets that are restrictive (Whole30, Keto, Atkins, Paleo, [place], etc) may lead to rebound weight gain and nutritional deficiencies.   Helpful apps: LoseIt, MyNasseo Pal  Finding a fitness stefan or utilizing a group: Weight Watchers is still relevent.   Food delivery services, ie [employer]. Takes the guesswork out of diet prep, but can be expensive.     FDA Approved Weight Loss Medications:   -You may qualify for medication-assisted weight loss. Typically you need to have tried at least 6 months of diet and exercise with minimal success (<5% reduction in weight) in order for your insurance to approve such efforts.     GLP-1 agonists: Semaglutide (ozempic, wegovy, rebelsus) or liraglutide (once a day or once a week injection) $800-1000 out of pocket  Orlistat (OTC Ally)  Phentermine-topiramate (qsymia)  Buproprion-naltrexone (contrave) $400 out of pocket   Sympathomimetics (short term use only): phentermine, benzphetamine, diethylproprion, phendimetrizine. $20 out of pocket     Qualifications:   -BMI >27 with comorbidities (increased waist to hip ratio), high blood pressure, insulin resistance   -BMI >30

## 2022-03-31 NOTE — PROGRESS NOTES
INTERNAL MEDICINE INITIAL VISIT NOTE      CHIEF COMPLAINT     Chief Complaint   Patient presents with    Establish Care       ASSESSMENT/PLAN     Gayatri Henderson is a 41 y.o. female who presents with hx bilateral PE, obesity, hx covid-19, here today to establish care.     1. Encounter to establish care with new doctor  All previous labs, imaging, and notes reviewed up to the time point of this note.     2. Bilateral pulmonary embolism  3. COVID-19  On DOAC for at least the next year, follows with cardiology. Presumed to be provoked 2/2 covid.       4. Mixed dyslipidemia  Cholesterol risk very low    5. Class 2 severe obesity due to excess calories with serious comorbidity and body mass index (BMI) of 38.0 to 38.9 in adult  Discussed diet and exercise modifications    6. Encounter for screening mammogram for malignant neoplasm of breast  - Mammo Digital Screening Bilat; Future    7. Well woman exam  - Ambulatory referral/consult to Obstetrics / Gynecology; Future      All previous labs, imaging, and notes reviewed up to the time point of this note.   Patient here to establish care, given list of alternative providers.      reviewed and discussed in detail with the patient.     RTC in 1 yr, sooner if needed and depending on labs.    HPI     Gayatri Henderson is a 41 y.o. female who presents with hx bilateral PE, obesity, hx covid-19, here today to establish care.      She was previously seen by providers at [facility].     Seen by cardiology 3/28/22. Had covid late 2021, PE in early 2022. Is planned to be on eliquis x 1 year full dose. She had prolonged symptoms of pneumonia since her illness, but currently is doing ok. Mom also had blood clots. She is going to see hematology for assessment.     No other major issues or concerns.   Unfortunately she was not vaccinated against covid.     The ASCVD Risk score (Convoy YOANDY Jr., et al., 2013) failed to calculate for the following reasons:     Unable to determine if patient is Non-   0.1% risk - of ascvd    Past Medical History:  Past Medical History:   Diagnosis Date    Allergy        Past Surgical History:  Past Surgical History:   Procedure Laterality Date    WISDOM TOOTH EXTRACTION      ?late 20s       Home Medications:  Prior to Admission medications    Medication Sig Start Date End Date Taking? Authorizing Provider   apixaban (ELIQUIS) 5 mg Tab Take 1 tablet (5 mg total) by mouth 2 (two) times daily. 3/28/22 6/26/22  Romario Cutler MD   loratadine (CLARITIN) 10 mg tablet Take 1 tablet (10 mg total) by mouth once daily. 12/16/21 1/15/22  Julio Cage MD       Allergies:  Review of patient's allergies indicates:  No Known Allergies    Family History:  Family History   Problem Relation Age of Onset    Deep vein thrombosis Mother     No Known Problems Father     Heart failure Maternal Grandmother     Breast cancer Maternal Aunt     Cancer Maternal Uncle        Social History:  Social History     Tobacco Use    Smoking status: Never Smoker    Smokeless tobacco: Never Used   Substance Use Topics    Alcohol use: Not Currently     Comment: socially    Drug use: Never       Review of Systems:  Review of Systems   Constitutional: Negative for fever and unexpected weight change.   HENT: Negative for sinus pressure and sore throat.    Eyes: Negative for visual disturbance.   Respiratory: Negative for cough and shortness of breath.    Cardiovascular: Negative for chest pain and palpitations.   Gastrointestinal: Negative for constipation, diarrhea, nausea and vomiting.   Endocrine: Negative for polyuria.   Genitourinary: Negative for dysuria and urgency.   Musculoskeletal: Negative for arthralgias and myalgias.   Skin: Negative for rash.   Allergic/Immunologic: Negative for environmental allergies.   Neurological: Negative for dizziness, light-headedness and headaches.   Hematological: Does not bruise/bleed easily.  "  Psychiatric/Behavioral: Negative for agitation and decreased concentration. The patient is not nervous/anxious.        Health Maintenance:   Pap smear - due   covid vaccine - declines   mmg - ordered      PHYSICAL EXAM     /70   Pulse 95   Ht 5' 2" (1.575 m)   Wt 97.8 kg (215 lb 9.8 oz)   LMP 03/24/2022 (Within Days)   SpO2 97%   BMI 39.44 kg/m²     GEN - A+OX4, NAD obese, otherwise well appearing older woman   HEENT - PERRL, EOMI, OP clear  Neck - No thyromegaly or thyroid masses felt.  No cervical lymphadenopathy appreciated.  CV - RRR, no m/r/g   Chest - CTAB, no wheezing, crackles, or rhonchi   Abd - S/NT/ND/+BS.   Ext - 2+BDP. No C/C/E.  LN - No LAD appreciated.  Skin - Normal color and texture, no rash, no skin lesions.      LABS     Lab Results   Component Value Date    WBC 6.42 03/28/2022    HGB 12.2 03/28/2022    HCT 36.7 (L) 03/28/2022    MCV 80 (L) 03/28/2022     03/28/2022     Sodium   Date Value Ref Range Status   03/28/2022 140 136 - 145 mmol/L Final     Potassium   Date Value Ref Range Status   03/28/2022 4.1 3.5 - 5.1 mmol/L Final     Chloride   Date Value Ref Range Status   03/28/2022 103 95 - 110 mmol/L Final     CO2   Date Value Ref Range Status   03/28/2022 30 (H) 23 - 29 mmol/L Final     Glucose   Date Value Ref Range Status   03/28/2022 89 70 - 110 mg/dL Final     BUN   Date Value Ref Range Status   03/28/2022 9 6 - 20 mg/dL Final     Creatinine   Date Value Ref Range Status   03/28/2022 0.8 0.5 - 1.4 mg/dL Final     Calcium   Date Value Ref Range Status   03/28/2022 10.0 8.7 - 10.5 mg/dL Final     Total Protein   Date Value Ref Range Status   03/28/2022 7.9 6.0 - 8.4 g/dL Final     Albumin   Date Value Ref Range Status   03/28/2022 3.8 3.5 - 5.2 g/dL Final     Total Bilirubin   Date Value Ref Range Status   03/28/2022 0.5 0.1 - 1.0 mg/dL Final     Comment:     For infants and newborns, interpretation of results should be based  on gestational age, weight and in agreement " with clinical  observations.    Premature Infant recommended reference ranges:  Up to 24 hours.............<8.0 mg/dL  Up to 48 hours............<12.0 mg/dL  3-5 days..................<15.0 mg/dL  6-29 days.................<15.0 mg/dL       Alkaline Phosphatase   Date Value Ref Range Status   03/28/2022 77 55 - 135 U/L Final     AST   Date Value Ref Range Status   03/28/2022 17 10 - 40 U/L Final     ALT   Date Value Ref Range Status   03/28/2022 19 10 - 44 U/L Final     Anion Gap   Date Value Ref Range Status   03/28/2022 7 (L) 8 - 16 mmol/L Final     eGFR if    Date Value Ref Range Status   03/28/2022 >60.0 >60 mL/min/1.73 m^2 Final     eGFR if non    Date Value Ref Range Status   03/28/2022 >60.0 >60 mL/min/1.73 m^2 Final     Comment:     Calculation used to obtain the estimated glomerular filtration  rate (eGFR) is the CKD-EPI equation.        No results found for: LABA1C, HGBA1C  Lab Results   Component Value Date    LDLCALC 109.0 03/28/2022     Lab Results   Component Value Date    TSH 1.629 03/28/2022           Marni Martino MD   [facility]   Department of Internal Medicine   04/01/2022

## 2022-04-01 ENCOUNTER — PATIENT MESSAGE (OUTPATIENT)
Dept: CARDIOLOGY | Facility: CLINIC | Age: 42
End: 2022-04-01
Payer: COMMERCIAL

## 2022-04-22 ENCOUNTER — LAB VISIT (OUTPATIENT)
Dept: LAB | Facility: OTHER | Age: 42
End: 2022-04-22
Attending: STUDENT IN AN ORGANIZED HEALTH CARE EDUCATION/TRAINING PROGRAM
Payer: COMMERCIAL

## 2022-04-22 DIAGNOSIS — I26.99 BILATERAL PULMONARY EMBOLISM: ICD-10-CM

## 2022-04-22 DIAGNOSIS — D64.9 MILD ANEMIA: ICD-10-CM

## 2022-04-22 LAB
BASOPHILS # BLD AUTO: 0.02 K/UL (ref 0–0.2)
BASOPHILS NFR BLD: 0.3 % (ref 0–1.9)
D DIMER PPP IA.FEU-MCNC: 0.46 MG/L FEU
DIFFERENTIAL METHOD: ABNORMAL
EOSINOPHIL # BLD AUTO: 0.2 K/UL (ref 0–0.5)
EOSINOPHIL NFR BLD: 2 % (ref 0–8)
ERYTHROCYTE [DISTWIDTH] IN BLOOD BY AUTOMATED COUNT: 14.9 % (ref 11.5–14.5)
HCT VFR BLD AUTO: 37.1 % (ref 37–48.5)
HGB BLD-MCNC: 12.5 G/DL (ref 12–16)
IMM GRANULOCYTES # BLD AUTO: 0.01 K/UL (ref 0–0.04)
IMM GRANULOCYTES NFR BLD AUTO: 0.1 % (ref 0–0.5)
LYMPHOCYTES # BLD AUTO: 2.9 K/UL (ref 1–4.8)
LYMPHOCYTES NFR BLD: 39.1 % (ref 18–48)
MCH RBC QN AUTO: 27.1 PG (ref 27–31)
MCHC RBC AUTO-ENTMCNC: 33.7 G/DL (ref 32–36)
MCV RBC AUTO: 80 FL (ref 82–98)
MONOCYTES # BLD AUTO: 0.6 K/UL (ref 0.3–1)
MONOCYTES NFR BLD: 8.7 % (ref 4–15)
NEUTROPHILS # BLD AUTO: 3.7 K/UL (ref 1.8–7.7)
NEUTROPHILS NFR BLD: 49.8 % (ref 38–73)
NRBC BLD-RTO: 0 /100 WBC
PLATELET # BLD AUTO: 388 K/UL (ref 150–450)
PMV BLD AUTO: 11.3 FL (ref 9.2–12.9)
RBC # BLD AUTO: 4.62 M/UL (ref 4–5.4)
WBC # BLD AUTO: 7.34 K/UL (ref 3.9–12.7)

## 2022-04-22 PROCEDURE — 85025 COMPLETE CBC W/AUTO DIFF WBC: CPT | Performed by: STUDENT IN AN ORGANIZED HEALTH CARE EDUCATION/TRAINING PROGRAM

## 2022-04-22 PROCEDURE — 36415 COLL VENOUS BLD VENIPUNCTURE: CPT | Performed by: STUDENT IN AN ORGANIZED HEALTH CARE EDUCATION/TRAINING PROGRAM

## 2022-04-22 PROCEDURE — 85379 FIBRIN DEGRADATION QUANT: CPT | Performed by: STUDENT IN AN ORGANIZED HEALTH CARE EDUCATION/TRAINING PROGRAM

## 2022-04-25 ENCOUNTER — OFFICE VISIT (OUTPATIENT)
Dept: HEMATOLOGY/ONCOLOGY | Facility: CLINIC | Age: 42
End: 2022-04-25
Payer: COMMERCIAL

## 2022-04-25 VITALS
DIASTOLIC BLOOD PRESSURE: 60 MMHG | OXYGEN SATURATION: 97 % | BODY MASS INDEX: 39.56 KG/M2 | HEIGHT: 62 IN | RESPIRATION RATE: 16 BRPM | TEMPERATURE: 99 F | HEART RATE: 73 BPM | WEIGHT: 214.94 LBS | SYSTOLIC BLOOD PRESSURE: 117 MMHG

## 2022-04-25 DIAGNOSIS — Z86.16 HISTORY OF COVID-19: ICD-10-CM

## 2022-04-25 DIAGNOSIS — Z82.49 FAMILY HISTORY OF BLOOD CLOTS: ICD-10-CM

## 2022-04-25 DIAGNOSIS — D64.9 MILD ANEMIA: ICD-10-CM

## 2022-04-25 DIAGNOSIS — I26.99 BILATERAL PULMONARY EMBOLISM: ICD-10-CM

## 2022-04-25 DIAGNOSIS — I26.99 BILATERAL PULMONARY EMBOLISM: Primary | ICD-10-CM

## 2022-04-25 DIAGNOSIS — N92.4 EXCESSIVE BLEEDING IN PREMENOPAUSAL PERIOD: ICD-10-CM

## 2022-04-25 PROCEDURE — 99999 PR PBB SHADOW E&M-EST. PATIENT-LVL III: CPT | Mod: PBBFAC,,, | Performed by: STUDENT IN AN ORGANIZED HEALTH CARE EDUCATION/TRAINING PROGRAM

## 2022-04-25 PROCEDURE — 3078F DIAST BP <80 MM HG: CPT | Mod: CPTII,S$GLB,, | Performed by: STUDENT IN AN ORGANIZED HEALTH CARE EDUCATION/TRAINING PROGRAM

## 2022-04-25 PROCEDURE — 3008F BODY MASS INDEX DOCD: CPT | Mod: CPTII,S$GLB,, | Performed by: STUDENT IN AN ORGANIZED HEALTH CARE EDUCATION/TRAINING PROGRAM

## 2022-04-25 PROCEDURE — 1160F RVW MEDS BY RX/DR IN RCRD: CPT | Mod: CPTII,S$GLB,, | Performed by: STUDENT IN AN ORGANIZED HEALTH CARE EDUCATION/TRAINING PROGRAM

## 2022-04-25 PROCEDURE — 3074F SYST BP LT 130 MM HG: CPT | Mod: CPTII,S$GLB,, | Performed by: STUDENT IN AN ORGANIZED HEALTH CARE EDUCATION/TRAINING PROGRAM

## 2022-04-25 PROCEDURE — 3074F PR MOST RECENT SYSTOLIC BLOOD PRESSURE < 130 MM HG: ICD-10-PCS | Mod: CPTII,S$GLB,, | Performed by: STUDENT IN AN ORGANIZED HEALTH CARE EDUCATION/TRAINING PROGRAM

## 2022-04-25 PROCEDURE — 99213 OFFICE O/P EST LOW 20 MIN: CPT | Mod: S$GLB,,, | Performed by: STUDENT IN AN ORGANIZED HEALTH CARE EDUCATION/TRAINING PROGRAM

## 2022-04-25 PROCEDURE — 3008F PR BODY MASS INDEX (BMI) DOCUMENTED: ICD-10-PCS | Mod: CPTII,S$GLB,, | Performed by: STUDENT IN AN ORGANIZED HEALTH CARE EDUCATION/TRAINING PROGRAM

## 2022-04-25 PROCEDURE — 1160F PR REVIEW ALL MEDS BY PRESCRIBER/CLIN PHARMACIST DOCUMENTED: ICD-10-PCS | Mod: CPTII,S$GLB,, | Performed by: STUDENT IN AN ORGANIZED HEALTH CARE EDUCATION/TRAINING PROGRAM

## 2022-04-25 PROCEDURE — 99213 PR OFFICE/OUTPT VISIT, EST, LEVL III, 20-29 MIN: ICD-10-PCS | Mod: S$GLB,,, | Performed by: STUDENT IN AN ORGANIZED HEALTH CARE EDUCATION/TRAINING PROGRAM

## 2022-04-25 PROCEDURE — 1159F PR MEDICATION LIST DOCUMENTED IN MEDICAL RECORD: ICD-10-PCS | Mod: CPTII,S$GLB,, | Performed by: STUDENT IN AN ORGANIZED HEALTH CARE EDUCATION/TRAINING PROGRAM

## 2022-04-25 PROCEDURE — 3078F PR MOST RECENT DIASTOLIC BLOOD PRESSURE < 80 MM HG: ICD-10-PCS | Mod: CPTII,S$GLB,, | Performed by: STUDENT IN AN ORGANIZED HEALTH CARE EDUCATION/TRAINING PROGRAM

## 2022-04-25 PROCEDURE — 1159F MED LIST DOCD IN RCRD: CPT | Mod: CPTII,S$GLB,, | Performed by: STUDENT IN AN ORGANIZED HEALTH CARE EDUCATION/TRAINING PROGRAM

## 2022-04-25 PROCEDURE — 99999 PR PBB SHADOW E&M-EST. PATIENT-LVL III: ICD-10-PCS | Mod: PBBFAC,,, | Performed by: STUDENT IN AN ORGANIZED HEALTH CARE EDUCATION/TRAINING PROGRAM

## 2022-04-25 NOTE — PROGRESS NOTES
Hematology- Oncology Clinic Note :      4/25/2022    RFV / chief complaint- Follow-up and Bilateral pulmonary embolism        HPI  Pt is a 41 y.o. female who  has a past medical history of Allergy.   Pt presents to the clinic today for PE    Had covid in Dec 2021, diagnosed with PNA on 1/4/22.   On Jan 11/2022 she went with chest pain when CTA showed b/l PE     Tolerating anticoagulation well without any bleeding issues. She saw cardiologist and was recommended anticoagulation for long term.     No hx of miscarriages or prior DVT.   Mother with DVT   No hx of PUD or bleeding issues.     Reviewed past medical/surgical/social history    Past Medical History:   Diagnosis Date    Allergy       Past Surgical History:   Procedure Laterality Date    WISDOM TOOTH EXTRACTION      ?late 20s      Review of patient's allergies indicates:  No Known Allergies   Social History     Tobacco Use    Smoking status: Never Smoker    Smokeless tobacco: Never Used   Substance Use Topics    Alcohol use: Not Currently     Comment: socially      Family History   Problem Relation Age of Onset    Deep vein thrombosis Mother     No Known Problems Father     Heart failure Maternal Grandmother     Breast cancer Maternal Aunt     Cancer Maternal Uncle           Review of Systems :  Review of Systems   Constitutional: Positive for malaise/fatigue. Negative for chills, diaphoresis, fever and weight loss.   HENT: Negative.  Negative for congestion, hearing loss, nosebleeds, sore throat and tinnitus.    Eyes: Negative.  Negative for blurred vision and discharge.   Respiratory: Negative for cough, hemoptysis, sputum production, shortness of breath and wheezing.    Cardiovascular: Negative.  Negative for chest pain, palpitations and leg swelling.   Gastrointestinal: Negative.  Negative for abdominal pain, blood in stool, constipation, diarrhea, heartburn, melena, nausea and vomiting.   Genitourinary: Negative.    Musculoskeletal: Negative.   "Negative for back pain, falls, joint pain and myalgias.   Skin: Negative.  Negative for itching and rash.   Neurological: Negative.  Negative for dizziness, tingling, sensory change, speech change, focal weakness, seizures, loss of consciousness, weakness and headaches.   Endo/Heme/Allergies: Negative.  Does not bruise/bleed easily.   Psychiatric/Behavioral: Negative.  Negative for depression. The patient is not nervous/anxious and does not have insomnia.                Physical Exam :  /60 (BP Location: Left arm, Patient Position: Sitting, BP Method: Medium (Automatic))   Pulse 73   Temp 98.5 °F (36.9 °C) (Oral)   Resp 16   Ht 5' 2" (1.575 m)   Wt 97.5 kg (214 lb 15.2 oz)   LMP 04/18/2022   SpO2 97%   BMI 39.31 kg/m²   Wt Readings from Last 3 Encounters:   04/25/22 97.5 kg (214 lb 15.2 oz)   03/31/22 97.8 kg (215 lb 9.8 oz)   03/28/22 96.7 kg (213 lb 3 oz)       Body mass index is 39.31 kg/m².      Physical Exam  Constitutional:       General: She is not in acute distress.     Appearance: Normal appearance. She is not ill-appearing.   HENT:      Head: Normocephalic and atraumatic.      Right Ear: External ear normal.      Left Ear: External ear normal.   Eyes:      General: No scleral icterus.        Right eye: No discharge.         Left eye: No discharge.   Pulmonary:      Effort: Pulmonary effort is normal. No respiratory distress.   Skin:     Coloration: Skin is not jaundiced.      Findings: No erythema or rash.   Neurological:      Mental Status: She is alert and oriented to person, place, and time. Mental status is at baseline.   Psychiatric:         Mood and Affect: Mood normal.         Speech: Speech normal.         Behavior: Behavior normal.             Current Outpatient Medications   Medication Sig Dispense Refill    apixaban (ELIQUIS) 5 mg Tab Take 1 tablet (5 mg total) by mouth 2 (two) times daily. 180 tablet 3    loratadine (CLARITIN) 10 mg tablet Take 1 tablet (10 mg total) by mouth " once daily. 30 tablet 0     No current facility-administered medications for this visit.       Pertinent Diagnostic studies:      Lab Visit on 04/22/2022   Component Date Value Ref Range Status    D-Dimer 04/22/2022 0.46  <0.50 mg/L FEU Final    Comment: The quantitative D-dimer assay should be used as an aid in   the diagnosis of deep vein thrombosis and pulmonary embolism  in patients with the appropriate presentation and clinical  history. The upper limit of the reference interval and the clinical   cut off   point are identical. Causes of a positive (>0.50 mg/L FEU) D-Dimer   test  include, but are not limited to: DVT, PE, DIC, thrombolytic   therapy, anticoagulant therapy, recent surgery, trauma, or   pregnancy, disseminated malignancy, aortic aneurysm, cirrhosis,  and severe infection. False negative results may occur in   patients with distal DVT.  BRIAN^612^^[id number]^  LOT^610^DDiSup^[id number]^[id number]      WBC 04/22/2022 7.34  3.90 - 12.70 K/uL Final    RBC 04/22/2022 4.62  4.00 - 5.40 M/uL Final    Hemoglobin 04/22/2022 12.5  12.0 - 16.0 g/dL Final    Hematocrit 04/22/2022 37.1  37.0 - 48.5 % Final    MCV 04/22/2022 80 (A) 82 - 98 fL Final    MCH 04/22/2022 27.1  27.0 - 31.0 pg Final    MCHC 04/22/2022 33.7  32.0 - 36.0 g/dL Final    RDW 04/22/2022 14.9 (A) 11.5 - 14.5 % Final    Platelets 04/22/2022 388  150 - 450 K/uL Final    MPV 04/22/2022 11.3  9.2 - 12.9 fL Final    Immature Granulocytes 04/22/2022 0.1  0.0 - 0.5 % Final    Gran # (ANC) 04/22/2022 3.7  1.8 - 7.7 K/uL Final    Immature Grans (Abs) 04/22/2022 0.01  0.00 - 0.04 K/uL Final    Comment: Mild elevation in immature granulocytes is non specific and   can be seen in a variety of conditions including stress response,   acute inflammation, trauma and pregnancy. Correlation with other   laboratory and clinical findings is essential.      Lymph # 04/22/2022 2.9  1.0 - 4.8 K/uL Final    Mono # 04/22/2022 0.6  0.3 - 1.0 K/uL  Final    Eos # 04/22/2022 0.2  0.0 - 0.5 K/uL Final    Baso # 04/22/2022 0.02  0.00 - 0.20 K/uL Final    nRBC 04/22/2022 0  0 /100 WBC Final    Gran % 04/22/2022 49.8  38.0 - 73.0 % Final    Lymph % 04/22/2022 39.1  18.0 - 48.0 % Final    Mono % 04/22/2022 8.7  4.0 - 15.0 % Final    Eosinophil % 04/22/2022 2.0  0.0 - 8.0 % Final    Basophil % 04/22/2022 0.3  0.0 - 1.9 % Final    Differential Method 04/22/2022 Automated   Final           Oncology History    No history exists.     Assessment :   ECOG 1    1. Bilateral pulmonary embolism    2. Family history of blood clots    3. Mild anemia        Plan :     Bilateral PE in the setting of covid infection   CTA chest 1/11/22- . Bilateral pulmonary emboli as detailed above.  No evidence of right heart strain.2. Numerous small nodular and ground-glass opacities in the bilateral lungs, likely sequela of resolving multifocal pneumonia.  US 1/12/22 No evidence of deep venous thrombosis in either lower extremity noting calf vessels were note evaluated.  Screening MMG recd. Mother with DVT, no known hypercoag disorder.  hypercoag work up will not , hence defer work up, pt agreeable.   Recd anticoagulation for 3 to 6 months. Continue for another 3 month and discontinue. Safety while on anticaog and ER precautions d/w pt.   RTC 3 months    Mild anemia-microcytic. Hb wnl. Continue monitoring.     Obesity   Body mass index is 39.31 kg/m².  Lifestyle modifications d/w pt     Electronically signed by Elisabeth Braxton    [facility]      Future Appointments   Date Time Provider Department Center   6/2/2022  3:15 PM [facility] MAMMO1 [facility] MAMMO [facility]   7/25/2022 11:20 AM LAB, B[facility]DR[facility]   7/27/2022  2:00 PM Elisabeth Braxton MD [facility] HEM ONC [facility]           This note was created with voice recognition software.  Grammatical, syntax and spelling errors may be inevitable.

## 2022-06-02 ENCOUNTER — HOSPITAL ENCOUNTER (OUTPATIENT)
Dept: RADIOLOGY | Facility: OTHER | Age: 42
Discharge: HOME | End: 2022-06-02
Attending: STUDENT IN AN ORGANIZED HEALTH CARE EDUCATION/TRAINING PROGRAM
Payer: COMMERCIAL

## 2022-06-02 DIAGNOSIS — Z12.31 ENCOUNTER FOR SCREENING MAMMOGRAM FOR MALIGNANT NEOPLASM OF BREAST: ICD-10-CM

## 2022-06-02 PROCEDURE — 77063 MAMMO DIGITAL SCREENING BILAT WITH TOMO: ICD-10-PCS | Mod: 26,,, | Performed by: RADIOLOGY

## 2022-06-02 PROCEDURE — 77067 MAMMO DIGITAL SCREENING BILAT WITH TOMO: ICD-10-PCS | Mod: 26,,, | Performed by: RADIOLOGY

## 2022-06-02 PROCEDURE — 77063 BREAST TOMOSYNTHESIS BI: CPT | Mod: 26,,, | Performed by: RADIOLOGY

## 2022-06-02 PROCEDURE — 77067 SCR MAMMO BI INCL CAD: CPT | Mod: 26,,, | Performed by: RADIOLOGY

## 2022-06-02 PROCEDURE — 77063 BREAST TOMOSYNTHESIS BI: CPT | Mod: TC

## 2022-06-02 PROCEDURE — 77067 SCR MAMMO BI INCL CAD: CPT | Mod: TC

## 2022-08-04 ENCOUNTER — TELEPHONE (OUTPATIENT)
Dept: OBSTETRICS AND GYNECOLOGY | Facility: CLINIC | Age: 42
End: 2022-08-04

## 2022-08-18 ENCOUNTER — OFFICE VISIT (OUTPATIENT)
Dept: OBSTETRICS AND GYNECOLOGY | Facility: CLINIC | Age: 42
End: 2022-08-18
Payer: COMMERCIAL

## 2022-08-18 VITALS
DIASTOLIC BLOOD PRESSURE: 76 MMHG | BODY MASS INDEX: 40.52 KG/M2 | WEIGHT: 221.56 LBS | SYSTOLIC BLOOD PRESSURE: 122 MMHG

## 2022-08-18 DIAGNOSIS — N92.0 MENORRHAGIA WITH REGULAR CYCLE: ICD-10-CM

## 2022-08-18 DIAGNOSIS — E78.2 MIXED DYSLIPIDEMIA: ICD-10-CM

## 2022-08-18 DIAGNOSIS — I26.99 BILATERAL PULMONARY EMBOLISM: ICD-10-CM

## 2022-08-18 DIAGNOSIS — Z01.419 WELL WOMAN EXAM WITH ROUTINE GYNECOLOGICAL EXAM: Primary | ICD-10-CM

## 2022-08-18 DIAGNOSIS — E66.01 CLASS 2 SEVERE OBESITY DUE TO EXCESS CALORIES WITH SERIOUS COMORBIDITY AND BODY MASS INDEX (BMI) OF 38.0 TO 38.9 IN ADULT: ICD-10-CM

## 2022-08-18 DIAGNOSIS — Z12.31 VISIT FOR SCREENING MAMMOGRAM: ICD-10-CM

## 2022-08-18 DIAGNOSIS — Z11.3 SCREENING FOR STD (SEXUALLY TRANSMITTED DISEASE): ICD-10-CM

## 2022-08-18 PROCEDURE — 99999 PR PBB SHADOW E&M-EST. PATIENT-LVL II: CPT | Mod: PBBFAC,,, | Performed by: OBSTETRICS & GYNECOLOGY

## 2022-08-18 PROCEDURE — 88175 CYTOPATH C/V AUTO FLUID REDO: CPT | Performed by: OBSTETRICS & GYNECOLOGY

## 2022-08-18 PROCEDURE — 87481 CANDIDA DNA AMP PROBE: CPT | Mod: 59 | Performed by: OBSTETRICS & GYNECOLOGY

## 2022-08-18 PROCEDURE — 87491 CHLMYD TRACH DNA AMP PROBE: CPT | Performed by: OBSTETRICS & GYNECOLOGY

## 2022-08-18 PROCEDURE — 1160F RVW MEDS BY RX/DR IN RCRD: CPT | Mod: CPTII,S$GLB,, | Performed by: OBSTETRICS & GYNECOLOGY

## 2022-08-18 PROCEDURE — 1159F MED LIST DOCD IN RCRD: CPT | Mod: CPTII,S$GLB,, | Performed by: OBSTETRICS & GYNECOLOGY

## 2022-08-18 PROCEDURE — 3008F BODY MASS INDEX DOCD: CPT | Mod: CPTII,S$GLB,, | Performed by: OBSTETRICS & GYNECOLOGY

## 2022-08-18 PROCEDURE — 1159F PR MEDICATION LIST DOCUMENTED IN MEDICAL RECORD: ICD-10-PCS | Mod: CPTII,S$GLB,, | Performed by: OBSTETRICS & GYNECOLOGY

## 2022-08-18 PROCEDURE — 3074F SYST BP LT 130 MM HG: CPT | Mod: CPTII,S$GLB,, | Performed by: OBSTETRICS & GYNECOLOGY

## 2022-08-18 PROCEDURE — 99386 PREV VISIT NEW AGE 40-64: CPT | Mod: S$GLB,,, | Performed by: OBSTETRICS & GYNECOLOGY

## 2022-08-18 PROCEDURE — 87591 N.GONORRHOEAE DNA AMP PROB: CPT | Performed by: OBSTETRICS & GYNECOLOGY

## 2022-08-18 PROCEDURE — 3008F PR BODY MASS INDEX (BMI) DOCUMENTED: ICD-10-PCS | Mod: CPTII,S$GLB,, | Performed by: OBSTETRICS & GYNECOLOGY

## 2022-08-18 PROCEDURE — 99999 PR PBB SHADOW E&M-EST. PATIENT-LVL II: ICD-10-PCS | Mod: PBBFAC,,, | Performed by: OBSTETRICS & GYNECOLOGY

## 2022-08-18 PROCEDURE — 1160F PR REVIEW ALL MEDS BY PRESCRIBER/CLIN PHARMACIST DOCUMENTED: ICD-10-PCS | Mod: CPTII,S$GLB,, | Performed by: OBSTETRICS & GYNECOLOGY

## 2022-08-18 PROCEDURE — 3078F DIAST BP <80 MM HG: CPT | Mod: CPTII,S$GLB,, | Performed by: OBSTETRICS & GYNECOLOGY

## 2022-08-18 PROCEDURE — 3078F PR MOST RECENT DIASTOLIC BLOOD PRESSURE < 80 MM HG: ICD-10-PCS | Mod: CPTII,S$GLB,, | Performed by: OBSTETRICS & GYNECOLOGY

## 2022-08-18 PROCEDURE — 99386 PR PREVENTIVE VISIT,NEW,40-64: ICD-10-PCS | Mod: S$GLB,,, | Performed by: OBSTETRICS & GYNECOLOGY

## 2022-08-18 PROCEDURE — 3074F PR MOST RECENT SYSTOLIC BLOOD PRESSURE < 130 MM HG: ICD-10-PCS | Mod: CPTII,S$GLB,, | Performed by: OBSTETRICS & GYNECOLOGY

## 2022-08-18 PROCEDURE — 87624 HPV HI-RISK TYP POOLED RSLT: CPT | Performed by: OBSTETRICS & GYNECOLOGY

## 2022-08-18 RX ORDER — NITROFURANTOIN 25; 75 MG/1; MG/1
100 CAPSULE ORAL 2 TIMES DAILY
COMMUNITY
Start: 2022-03-01 | End: 2022-08-18

## 2022-08-18 NOTE — PROGRESS NOTES
HISTORY OF PRESENT ILLNESS:    Gayatri Henderson is a 42 y.o. female, No obstetric history on file., Patient's last menstrual period was 08/01/2022.,  presents for a routine exam and has no complaints.  Patient reports cycles occur every 4 weeks lasting 5-7 days using 2-3 overnight pads per day with clotting.   She denies any BTB.     She uses condoms birth control.   She does desire STD screening.    The patient participates in regular exercise: yes.    The patient does not smoke.    The patient wears seatbelts.     Pt denies any domestic violence.    Past Medical History:   Diagnosis Date    Allergy     Bilateral pulmonary embolism 1/11/2022    Class 2 severe obesity due to excess calories with serious comorbidity and body mass index (BMI) of 38.0 to 38.9 in adult 3/27/2022    COVID-19 3/27/2022    Mixed dyslipidemia 3/31/2022       Past Surgical History:   Procedure Laterality Date    WISDOM TOOTH EXTRACTION      ?late 20s       MEDICATIONS AND ALLERGIES:      Current Outpatient Medications:     loratadine (CLARITIN) 10 mg tablet, Take 1 tablet (10 mg total) by mouth once daily., Disp: 30 tablet, Rfl: 0    Review of patient's allergies indicates:  No Known Allergies    Family History   Problem Relation Age of Onset    Deep vein thrombosis Mother     No Known Problems Father     Heart failure Maternal Grandmother     Breast cancer Maternal Aunt     Cancer Maternal Uncle        Social History     Socioeconomic History    Marital status: Single   Tobacco Use    Smoking status: Never Smoker    Smokeless tobacco: Never Used   Substance and Sexual Activity    Alcohol use: Not Currently     Comment: socially    Drug use: Never    Sexual activity: Not Currently       COMPREHENSIVE GYN HISTORY:  PAP History: Denies abnormal Paps.  Infection History: Denies STDs. Denies PID.  Benign History: Denies uterine fibroids. Denies ovarian cysts. Denies endometriosis. Denies other conditions.  Cancer  History: Denies cervical cancer. Denies uterine cancer or hyperplasia. Denies ovarian cancer. Denies vulvar cancer or pre-cancer. Denies vaginal cancer or pre-cancer. Denies breast cancer. Denies colon cancer.  Sexual Activity History: Reports currently being sexually active  Menstrual History: Monthly. Mod then light flow.   Dysmenorrhea History: Reports mild dysmenorrhea.       ROS:  GENERAL: No weight changes. No swelling. No fatigue. No fever.  CARDIOVASCULAR: No chest pain. No shortness of breath. No leg cramps.   NEUROLOGICAL: No headaches. No vision changes.  BREASTS: No pain. No lumps. No discharge.  ABDOMEN: No pain. No nausea. No vomiting. No diarrhea. No constipation.  REPRODUCTIVE: No abnormal bleeding.   VULVA: No pain. No lesions. No itching.  VAGINA: No relaxation. No itching. No odor. No discharge. No lesions.  URINARY: No incontinence. No nocturia. No frequency. No dysuria.    /76   Wt 100.5 kg (221 lb 9 oz)   LMP 08/01/2022   BMI 40.52 kg/m²     PE:  APPEARANCE: Well nourished, well developed, in no acute distress.  AFFECT: WNL, alert and oriented x 3.  SKIN: No acne or hirsutism.  NECK: Neck symmetric, without masses or thyromegaly.  NODES: No inguinal, cervical, axillary or femoral lymph node enlargement.  CHEST: Good respiratory effort.   ABDOMEN: Soft. No tenderness or masses. No hepatosplenomegaly. No hernias.  BREASTS: Symmetrical, no skin changes, visible lesions, palpable masses or nipple discharge bilaterally.  PELVIC: External female genitalia without lesions.  Female hair distribution. Adequate perineal body, Normal urethral meatus. Vagina moist and well rugated without lesions or discharge.  No significant cystocele or rectocele present. Cervix pink without lesions, discharge or tenderness. Uterus is 12-14 week size, irregular, mobile and nontender. Adnexa without masses or tenderness.  EXTREMITIES: No edema    DIAGNOSIS:  1. Well woman exam with routine gynecological exam     2. Visit for screening mammogram    3. Menorrhagia with regular cycle    4. Mixed dyslipidemia    5. Bilateral pulmonary embolism    6. Class 2 severe obesity due to excess calories with serious comorbidity and body mass index (BMI) of 38.0 to 38.9 in adult    7. Screening for STD (sexually transmitted disease)        PLAN:    Orders Placed This Encounter    HPV High Risk Genotypes, PCR    C. trachomatis/N. gonorrhoeae by AMP DNA    Vaginosis Screen by DNA Probe    Hepatitis Panel, Acute    HSV 1 & 2, IgG    HIV 1/2 Ag/Ab (4th Gen)    RPR    Liquid-Based Pap Smear, Screening       COUNSELING:  The patient was counseled today on:  -A.C.S. Pap and pelvic exam guidelines (pap every 3 years), recomendations for yearly mammogram;  -to follow up with her PCP for other health maintenance.    FOLLOW-UP with me for EMBX

## 2022-08-21 LAB
C TRACH DNA SPEC QL NAA+PROBE: NOT DETECTED
N GONORRHOEA DNA SPEC QL NAA+PROBE: NOT DETECTED

## 2022-08-22 LAB
BACTERIAL VAGINOSIS DNA: NEGATIVE
CANDIDA GLABRATA DNA: NEGATIVE
CANDIDA KRUSEI DNA: NEGATIVE
CANDIDA RRNA VAG QL PROBE: NEGATIVE
T VAGINALIS RRNA GENITAL QL PROBE: NEGATIVE

## 2022-08-23 LAB
HPV HR 12 DNA SPEC QL NAA+PROBE: NEGATIVE
HPV16 AG SPEC QL: NEGATIVE
HPV18 DNA SPEC QL NAA+PROBE: NEGATIVE

## 2022-08-24 LAB
FINAL PATHOLOGIC DIAGNOSIS: NORMAL
Lab: NORMAL

## 2022-08-30 ENCOUNTER — HOSPITAL ENCOUNTER (OUTPATIENT)
Dept: RADIOLOGY | Facility: OTHER | Age: 42
Discharge: HOME | End: 2022-08-30
Attending: OBSTETRICS & GYNECOLOGY
Payer: COMMERCIAL

## 2022-08-30 DIAGNOSIS — N92.0 MENORRHAGIA WITH REGULAR CYCLE: ICD-10-CM

## 2022-08-30 PROCEDURE — 76830 TRANSVAGINAL US NON-OB: CPT | Mod: TC

## 2022-08-30 PROCEDURE — 76856 US EXAM PELVIC COMPLETE: CPT | Mod: 26,,, | Performed by: RADIOLOGY

## 2022-08-30 PROCEDURE — 76830 US PELVIS COMP WITH TRANSVAG NON-OB (XPD): ICD-10-PCS | Mod: 26,,, | Performed by: RADIOLOGY

## 2022-08-30 PROCEDURE — 76830 TRANSVAGINAL US NON-OB: CPT | Mod: 26,,, | Performed by: RADIOLOGY

## 2022-08-30 PROCEDURE — 76856 US PELVIS COMP WITH TRANSVAG NON-OB (XPD): ICD-10-PCS | Mod: 26,,, | Performed by: RADIOLOGY

## 2022-09-29 ENCOUNTER — OFFICE VISIT (OUTPATIENT)
Dept: PRIMARY CARE CLINIC | Facility: CLINIC | Age: 42
End: 2022-09-29
Payer: COMMERCIAL

## 2022-09-29 DIAGNOSIS — M10.9 GOUT, ARTHROPATHY: Primary | ICD-10-CM

## 2022-09-29 PROCEDURE — 1160F RVW MEDS BY RX/DR IN RCRD: CPT | Mod: CPTII,95,, | Performed by: FAMILY MEDICINE

## 2022-09-29 PROCEDURE — 1160F PR REVIEW ALL MEDS BY PRESCRIBER/CLIN PHARMACIST DOCUMENTED: ICD-10-PCS | Mod: CPTII,95,, | Performed by: FAMILY MEDICINE

## 2022-09-29 PROCEDURE — 1159F PR MEDICATION LIST DOCUMENTED IN MEDICAL RECORD: ICD-10-PCS | Mod: CPTII,95,, | Performed by: FAMILY MEDICINE

## 2022-09-29 PROCEDURE — 1159F MED LIST DOCD IN RCRD: CPT | Mod: CPTII,95,, | Performed by: FAMILY MEDICINE

## 2022-09-29 PROCEDURE — 99212 PR OFFICE/OUTPT VISIT, EST, LEVL II, 10-19 MIN: ICD-10-PCS | Mod: 95,,, | Performed by: FAMILY MEDICINE

## 2022-09-29 PROCEDURE — 99212 OFFICE O/P EST SF 10 MIN: CPT | Mod: 95,,, | Performed by: FAMILY MEDICINE

## 2022-09-29 RX ORDER — COLCHICINE 0.6 MG/1
CAPSULE ORAL
Qty: 30 CAPSULE | Refills: 0 | Status: SHIPPED | OUTPATIENT
Start: 2022-09-29 | End: 2023-03-28 | Stop reason: SDUPTHER

## 2022-09-29 RX ORDER — INDOMETHACIN 50 MG/1
50 CAPSULE ORAL 3 TIMES DAILY
Qty: 30 CAPSULE | Refills: 0 | Status: SHIPPED | OUTPATIENT
Start: 2022-09-29 | End: 2022-10-09

## 2022-09-29 NOTE — PROGRESS NOTES
The patient location is: [place]  The chief complaint leading to consultation is: gout    Visit type: audiovisual    Face to Face time with patient:   11 minutes of total time spent on the encounter, which includes face to face time and non-face to face time preparing to see the patient (eg, review of tests), Obtaining and/or reviewing separately obtained history, Documenting clinical information in the electronic or other health record, Independently interpreting results (not separately reported) and communicating results to the patient/family/caregiver, or Care coordination (not separately reported).         Each patient to whom he or she provides medical services by telemedicine is:  (1) informed of the relationship between the physician and patient and the respective role of any other health care provider with respect to management of the patient; and (2) notified that he or she may decline to receive medical services by telemedicine and may withdraw from such care at any time.    Notes:      HPI:  Patient is a 42-year-old female who presents with an acute gout attack involving her left lateral ankle for the past several weeks.  She reports swelling of her left foot and ankle and increased warmth.  Requesting refills on her colchicine and indomethacin.  She denies acute injury to ankle or foot.    Physical exam:  General: Alert, no acute distress   HEENT:  NC/AT, no facial swelling of deformity  Neck:  supple  Resp:  Normal effort, no respiratory distress   EXT:  left ankle poorly visualized, hyperpigmentation of skin noted.    Diagnoses and all orders for this visit:    Gout, arthropathy  -     colchicine (MITIGARE) 0.6 mg Cap; Take 2 tablets at onset of flare, may repeat dose in 1 hour on day 1, day #2 may take 1 tablet every 12 hours until flare resolves  -     indomethacin (INDOCIN) 50 MG capsule; Take 1 capsule (50 mg total) by mouth 3 (three) times daily. for 10 days

## 2022-11-04 ENCOUNTER — PATIENT MESSAGE (OUTPATIENT)
Dept: OBSTETRICS AND GYNECOLOGY | Facility: CLINIC | Age: 42
End: 2022-11-04
Payer: COMMERCIAL

## 2022-11-09 ENCOUNTER — OFFICE VISIT (OUTPATIENT)
Dept: URGENT CARE | Facility: CLINIC | Age: 42
End: 2022-11-09
Payer: COMMERCIAL

## 2022-11-09 ENCOUNTER — NURSE TRIAGE (OUTPATIENT)
Dept: ADMINISTRATIVE | Facility: CLINIC | Age: 42
End: 2022-11-09
Payer: COMMERCIAL

## 2022-11-09 VITALS
HEART RATE: 102 BPM | WEIGHT: 211 LBS | RESPIRATION RATE: 18 BRPM | TEMPERATURE: 98 F | DIASTOLIC BLOOD PRESSURE: 68 MMHG | SYSTOLIC BLOOD PRESSURE: 114 MMHG | BODY MASS INDEX: 38.83 KG/M2 | OXYGEN SATURATION: 98 % | HEIGHT: 62 IN

## 2022-11-09 DIAGNOSIS — R05.9 COUGH, UNSPECIFIED TYPE: ICD-10-CM

## 2022-11-09 DIAGNOSIS — J11.1 INFLUENZA: Primary | ICD-10-CM

## 2022-11-09 LAB
CTP QC/QA: YES
SARS-COV-2 AG RESP QL IA.RAPID: NEGATIVE

## 2022-11-09 PROCEDURE — 3078F PR MOST RECENT DIASTOLIC BLOOD PRESSURE < 80 MM HG: ICD-10-PCS | Mod: CPTII,S$GLB,,

## 2022-11-09 PROCEDURE — 3008F BODY MASS INDEX DOCD: CPT | Mod: CPTII,S$GLB,,

## 2022-11-09 PROCEDURE — 3074F SYST BP LT 130 MM HG: CPT | Mod: CPTII,S$GLB,,

## 2022-11-09 PROCEDURE — 1160F PR REVIEW ALL MEDS BY PRESCRIBER/CLIN PHARMACIST DOCUMENTED: ICD-10-PCS | Mod: CPTII,S$GLB,,

## 2022-11-09 PROCEDURE — 1159F MED LIST DOCD IN RCRD: CPT | Mod: CPTII,S$GLB,,

## 2022-11-09 PROCEDURE — 1159F PR MEDICATION LIST DOCUMENTED IN MEDICAL RECORD: ICD-10-PCS | Mod: CPTII,S$GLB,,

## 2022-11-09 PROCEDURE — 3008F PR BODY MASS INDEX (BMI) DOCUMENTED: ICD-10-PCS | Mod: CPTII,S$GLB,,

## 2022-11-09 PROCEDURE — 1160F RVW MEDS BY RX/DR IN RCRD: CPT | Mod: CPTII,S$GLB,,

## 2022-11-09 PROCEDURE — 3074F PR MOST RECENT SYSTOLIC BLOOD PRESSURE < 130 MM HG: ICD-10-PCS | Mod: CPTII,S$GLB,,

## 2022-11-09 PROCEDURE — 3078F DIAST BP <80 MM HG: CPT | Mod: CPTII,S$GLB,,

## 2022-11-09 PROCEDURE — 87811 SARS CORONAVIRUS 2 ANTIGEN POCT, MANUAL READ: ICD-10-PCS | Mod: QW,S$GLB,,

## 2022-11-09 PROCEDURE — 99214 OFFICE O/P EST MOD 30 MIN: CPT | Mod: S$GLB,,,

## 2022-11-09 PROCEDURE — 99214 PR OFFICE/OUTPT VISIT, EST, LEVL IV, 30-39 MIN: ICD-10-PCS | Mod: S$GLB,,,

## 2022-11-09 PROCEDURE — 87811 SARS-COV-2 COVID19 W/OPTIC: CPT | Mod: QW,S$GLB,,

## 2022-11-09 RX ORDER — OSELTAMIVIR PHOSPHATE 75 MG/1
75 CAPSULE ORAL 2 TIMES DAILY
Qty: 10 CAPSULE | Refills: 0 | Status: SHIPPED | OUTPATIENT
Start: 2022-11-09 | End: 2022-11-09

## 2022-11-09 RX ORDER — OSELTAMIVIR PHOSPHATE 75 MG/1
75 CAPSULE ORAL 2 TIMES DAILY
Qty: 10 CAPSULE | Refills: 0 | Status: SHIPPED | OUTPATIENT
Start: 2022-11-09 | End: 2022-11-16

## 2022-11-09 NOTE — LETTER
November 9, 2022      Urgent Care [address] [address]  Phone: [phone]  Fax: [phone]       Patient: Gayatri Henderson   YOB: 1980  Date of Visit: 11/09/2022    To Whom It May Concern:    Luc Henderson  was at [facility] on 11/09/2022. The patient may return to work/school on 11/14/2022 with no restrictions or sooner if fever free for 24 hours without the use of fever reducing medications. If you have any questions or concerns, or if I can be of further assistance, please do not hesitate to contact me.    Sincerely,        Neida Cole PA-C

## 2022-11-09 NOTE — PROGRESS NOTES
"Subjective:       Patient ID: Gayatri Henderson is a 42 y.o. female.    Vitals:  height is 5' 2" (1.575 m) and weight is 95.7 kg (211 lb). Her temperature is 98.2 °F (36.8 °C). Her blood pressure is 114/68 and her pulse is 102. Her respiration is 18 and oxygen saturation is 98%.     Chief Complaint: Cough    43 yo female presents to the urgent care with c/o cough. She is here today with her niece who is getting treated for the flu. Patient states that she has been around her niece and is having similar symptoms. Onset of symptoms occurred yesterday. Patient states one episode of vomiting food that she had eaten. Patient denies CP, SOB, diarrhea, and abdominal pain.     Cough  This is a new problem. The current episode started yesterday. The cough is Productive of sputum. Associated symptoms include chills, headaches, myalgias, postnasal drip and a sore throat. Pertinent negatives include no chest pain, ear pain, eye redness, fever, hemoptysis, rash, shortness of breath or wheezing. She has tried OTC cough suppressant for the symptoms. The treatment provided mild relief.     Constitution: Positive for appetite change, chills and fatigue. Negative for sweating, fever and generalized weakness.   HENT:  Positive for congestion, postnasal drip and sore throat. Negative for ear pain, ear discharge, nosebleeds, foreign body in nose, sinus pain, sinus pressure, trouble swallowing and voice change.    Neck: Negative for neck pain, neck stiffness and neck swelling.   Cardiovascular:  Negative for chest pain, palpitations and sob on exertion.   Eyes:  Negative for eye discharge, eye itching, eye pain and eye redness.   Respiratory:  Positive for cough and sputum production (clear in color). Negative for chest tightness, bloody sputum, shortness of breath, stridor and wheezing.    Gastrointestinal:  Positive for vomiting. Negative for abdominal pain, nausea, constipation and diarrhea.   Genitourinary:  Negative for " flank pain.   Musculoskeletal:  Positive for muscle ache. Negative for muscle cramps.   Skin:  Negative for rash.   Allergic/Immunologic: Negative for itching.   Neurological:  Positive for headaches.     Objective:      Physical Exam   Constitutional:  Non-toxic appearance. She does not appear ill. No distress. normal  HENT:   Head: Normocephalic and atraumatic.   Ears:   Right Ear: Tympanic membrane, external ear and ear canal normal. impacted cerumen  Left Ear: Tympanic membrane, external ear and ear canal normal. impacted cerumen  Nose: Rhinorrhea and congestion present.   Mouth/Throat: Posterior oropharyngeal erythema present. No oropharyngeal exudate.   Eyes: Conjunctivae are normal. Pupils are equal, round, and reactive to light. Right eye exhibits no discharge. Left eye exhibits no discharge. No scleral icterus. Extraocular movement intact   Neck: Neck supple. No neck rigidity present.   Cardiovascular: Normal rate, regular rhythm, normal heart sounds and normal pulses.   No murmur heard.Exam reveals no gallop and no friction rub.   Pulmonary/Chest: Effort normal and breath sounds normal. No stridor. No respiratory distress. She has no wheezes. She has no rhonchi. She has no rales.   Abdominal: Normal appearance and bowel sounds are normal. She exhibits no distension. flat abdomen There is no abdominal tenderness. There is no guarding.   Musculoskeletal: Normal range of motion.         General: No swelling or tenderness. Normal range of motion.      Cervical back: She exhibits no tenderness.   Lymphadenopathy:     She has cervical adenopathy.   Neurological: no focal deficit. She is alert. She displays no weakness. No cranial nerve deficit. Coordination normal.   Skin: Skin is warm, not diaphoretic, not pale and no rash. Capillary refill takes less than 2 seconds. jaundice  Psychiatric: Mood normal.   Nursing note and vitals reviewed.      Results for orders placed or performed in visit on 11/09/22   SARS  Coronavirus 2 Antigen, POCT Manual Read   Result Value Ref Range    SARS Coronavirus 2 Antigen Negative Negative     Acceptable Yes        Assessment:       1. Influenza    2. Cough, unspecified type          Plan:     Previous external notes reviewed.  Vital signs reviewed.  Labs ordered. Labs reviewed.  Discussed influenza, home care, tx options, and given follow up precautions  Patient involved with the treatment plan and agreed to the plan.  Patient informed on warning signs, patient understood warning signs.  Patient informed to return to the urgent care or go to the ER if warning signs appear.  Patient offered promethazine DM, patient decline and stated that she has some at home.   Patients flu test did not result in clinic, patient informed her diagnosis of influenza is based off of clinical presentation and close exposure of influenza from her niece who has been with her.     Patient Instructions   Please drink plenty of fluids.  Please get plenty of rest.  Please return here or go to the Emergency Department for any concerns or worsening of condition.  Tamiflu prescription has been discussed and if prescribed, please take to completion unless you cannot tolerate the side effects.   If you do not have Hypertension or any history of palpitations, it is ok to take over the counter Sudafed or Mucinex D or Allegra-D or Claritin-D or Zyrtec-D.  If you do take one of the above, it is ok to combine that with plain over the counter Mucinex or Allegra or Claritin or Zyrtec.  If for example you are taking Zyrtec -D, you can combine that with Mucinex, but not Mucinex-D.  If you are taking Mucinex-D, you can combine that with plain Allegra or Claritin or Zyrtec.   If you do have Hypertension or palpitations, it is safe to take Coricidin HBP for relief of sinus symptoms.  If not allergic, please take over the counter Tylenol (Acetaminophen) and/or Motrin (Ibuprofen) as directed for control of pain and/or  fever.  Please follow up with your primary care doctor or specialist as needed.    If you  smoke, please stop smoking.      Influenza  -     Discontinue: oseltamivir (TAMIFLU) 75 MG capsule; Take 1 capsule (75 mg total) by mouth 2 (two) times daily. for 5 days  Dispense: 10 capsule; Refill: 0  -     Discontinue: oseltamivir (TAMIFLU) 75 MG capsule; Take 1 capsule (75 mg total) by mouth 2 (two) times daily. for 5 days  Dispense: 10 capsule; Refill: 0  -     oseltamivir (TAMIFLU) 75 MG capsule; Take 1 capsule (75 mg total) by mouth 2 (two) times daily. for 5 days  Dispense: 10 capsule; Refill: 0    Cough, unspecified type  -     SARS Coronavirus 2 Antigen, POCT Manual Read  -     POCT Influenza A/B MOLECULAR       Leidy'Suzanne Cole PA-C

## 2022-11-10 NOTE — TELEPHONE ENCOUNTER
Patient seen at  today. Patient dx with the flu and prescribed tamiflu. States that she is unable to fill the prescription because the provider's license is not on file. Triage nurse contacted [facility] pharmacy [id number] [place] [address]. Spoke with Lottie, who confirmed that the provider who wrote the prescription does not have a state license on file. Attempted to contact the [facility] twice but was unable to speak with someone on both occasions. Will send a message to the provider and facility to f/u with the patient. Patient will need a new prescription sent to [facility] pharmacy [id number] [place] [address]. Patient also advised to contact the  again.  Advised the patient to call back with any further questions or if symptoms worsen. Patient VU.     Reason for Disposition   [1] Prescription not at pharmacy AND [2] was prescribed by PCP recently  (Exception: triager has access to EMR and prescription is recorded there. Go to Home Care and confirm for pharmacy.)    Protocols used: Medication Refill and Renewal Call-A-

## 2022-11-16 ENCOUNTER — LAB VISIT (OUTPATIENT)
Dept: LAB | Facility: OTHER | Age: 42
End: 2022-11-16
Attending: OBSTETRICS & GYNECOLOGY
Payer: COMMERCIAL

## 2022-11-16 ENCOUNTER — PROCEDURE VISIT (OUTPATIENT)
Dept: OBSTETRICS AND GYNECOLOGY | Facility: CLINIC | Age: 42
End: 2022-11-16
Payer: COMMERCIAL

## 2022-11-16 VITALS
SYSTOLIC BLOOD PRESSURE: 110 MMHG | HEIGHT: 62 IN | BODY MASS INDEX: 38.94 KG/M2 | DIASTOLIC BLOOD PRESSURE: 70 MMHG | WEIGHT: 211.63 LBS

## 2022-11-16 DIAGNOSIS — E66.01 CLASS 2 SEVERE OBESITY DUE TO EXCESS CALORIES WITH SERIOUS COMORBIDITY AND BODY MASS INDEX (BMI) OF 38.0 TO 38.9 IN ADULT: ICD-10-CM

## 2022-11-16 DIAGNOSIS — I26.99 BILATERAL PULMONARY EMBOLISM: ICD-10-CM

## 2022-11-16 DIAGNOSIS — N92.0 MENORRHAGIA WITH REGULAR CYCLE: Primary | ICD-10-CM

## 2022-11-16 DIAGNOSIS — N92.0 MENORRHAGIA WITH REGULAR CYCLE: ICD-10-CM

## 2022-11-16 DIAGNOSIS — E78.2 MIXED DYSLIPIDEMIA: ICD-10-CM

## 2022-11-16 LAB
B-HCG UR QL: NEGATIVE
BASOPHILS # BLD AUTO: 0.02 K/UL (ref 0–0.2)
BASOPHILS NFR BLD: 0.4 % (ref 0–1.9)
CTP QC/QA: YES
DIFFERENTIAL METHOD: ABNORMAL
EOSINOPHIL # BLD AUTO: 0.1 K/UL (ref 0–0.5)
EOSINOPHIL NFR BLD: 1.6 % (ref 0–8)
ERYTHROCYTE [DISTWIDTH] IN BLOOD BY AUTOMATED COUNT: 14.5 % (ref 11.5–14.5)
HCT VFR BLD AUTO: 40.4 % (ref 37–48.5)
HGB BLD-MCNC: 13.6 G/DL (ref 12–16)
IMM GRANULOCYTES # BLD AUTO: 0.01 K/UL (ref 0–0.04)
IMM GRANULOCYTES NFR BLD AUTO: 0.2 % (ref 0–0.5)
LYMPHOCYTES # BLD AUTO: 2.7 K/UL (ref 1–4.8)
LYMPHOCYTES NFR BLD: 47.2 % (ref 18–48)
MCH RBC QN AUTO: 26.9 PG (ref 27–31)
MCHC RBC AUTO-ENTMCNC: 33.7 G/DL (ref 32–36)
MCV RBC AUTO: 80 FL (ref 82–98)
MONOCYTES # BLD AUTO: 0.5 K/UL (ref 0.3–1)
MONOCYTES NFR BLD: 9.2 % (ref 4–15)
NEUTROPHILS # BLD AUTO: 2.3 K/UL (ref 1.8–7.7)
NEUTROPHILS NFR BLD: 41.4 % (ref 38–73)
NRBC BLD-RTO: 0 /100 WBC
PLATELET # BLD AUTO: 366 K/UL (ref 150–450)
PMV BLD AUTO: 11.8 FL (ref 9.2–12.9)
RBC # BLD AUTO: 5.05 M/UL (ref 4–5.4)
T4 FREE SERPL-MCNC: 1.31 NG/DL (ref 0.71–1.51)
TSH SERPL DL<=0.005 MIU/L-ACNC: 1.17 UIU/ML (ref 0.4–4)
WBC # BLD AUTO: 5.63 K/UL (ref 3.9–12.7)

## 2022-11-16 PROCEDURE — 58100 BIOPSY (GYNECOLOGICAL): ICD-10-PCS | Mod: S$GLB,,, | Performed by: OBSTETRICS & GYNECOLOGY

## 2022-11-16 PROCEDURE — 84439 ASSAY OF FREE THYROXINE: CPT | Performed by: OBSTETRICS & GYNECOLOGY

## 2022-11-16 PROCEDURE — 58100 BIOPSY OF UTERUS LINING: CPT | Mod: S$GLB,,, | Performed by: OBSTETRICS & GYNECOLOGY

## 2022-11-16 PROCEDURE — 88305 TISSUE EXAM BY PATHOLOGIST: CPT | Performed by: PATHOLOGY

## 2022-11-16 PROCEDURE — 84443 ASSAY THYROID STIM HORMONE: CPT | Performed by: OBSTETRICS & GYNECOLOGY

## 2022-11-16 PROCEDURE — 36415 COLL VENOUS BLD VENIPUNCTURE: CPT | Performed by: OBSTETRICS & GYNECOLOGY

## 2022-11-16 PROCEDURE — 85025 COMPLETE CBC W/AUTO DIFF WBC: CPT | Performed by: OBSTETRICS & GYNECOLOGY

## 2022-11-16 PROCEDURE — 88305 TISSUE EXAM BY PATHOLOGIST: ICD-10-PCS | Mod: 26,,, | Performed by: PATHOLOGY

## 2022-11-16 PROCEDURE — 81025 POCT URINE PREGNANCY: ICD-10-PCS | Mod: S$GLB,,, | Performed by: OBSTETRICS & GYNECOLOGY

## 2022-11-16 PROCEDURE — 88305 TISSUE EXAM BY PATHOLOGIST: CPT | Mod: 26,,, | Performed by: PATHOLOGY

## 2022-11-16 PROCEDURE — 81025 URINE PREGNANCY TEST: CPT | Mod: S$GLB,,, | Performed by: OBSTETRICS & GYNECOLOGY

## 2022-11-16 NOTE — PATIENT INSTRUCTIONS
Please inform patients that her titers for Herpes are positive.   Herpes type titers can be positive secondary to exposure to fever blisters or exposure to genital herpes.   Also please inform patient a large percentage of the population has been exposed to herpes type I or fever blisters in the past and therefore the titers will be positive.  Please review symptoms signs and symptoms of genital herpes with patient as listed below.    Please assure that patient understands that the titers along cannot diagnosed her with genital herpes; however, if she does experience any of the symptoms listed below, that she does need to contact the office immediately for evaluation.    Genital herpes is a common sexually transmitted disease (STD). It is caused by the Herpes Simplex virus. One out of five (20%) teens and adults carry the herpes virus. During an outbreak, it causes small blisters that break open, leaving small painful ulcers (sores) in the genital area. Eventually, scabs form and the ulcers heal. In women, these are most often on the skin just outside the vaginal opening. They can occur on the buttocks, anus or cervix. In men, the sores are usually on the tip, sides or base of the penis. They also occur on the scrotum, buttocks or thighs.  The first episode begins within 2-3 weeks after exposure to an infected sexual partner. It may last 1-3 weeks and cause headache, muscle ache and fevers. The first outbreak is usually the worst. Because the virus remains in the body even after the sores heal, most persons will have recurrences. The frequency of recurrent outbreaks varies with each person. Some people will never have another outbreak. Others will have several episodes a year. Later outbreaks are usually shorter, milder and less painful. For many, the number of outbreaks tends to decrease over time. Various factors may trigger a recurrence. These include:  Emotional stress  Menstruation  Presence of another illness  "(cold, flu, or fever from any cause)  Overexertion and fatigue  Weakened immune system     Genital Herpes   The Basics   Written by the doctors and editors at [facility]   What is herpes? -- Herpes is an infection that can cause blisters and open sores on the genital area. Herpes is caused by a virus that is passed from person to person during vaginal, oral, or anal sex. Sometimes, people do not know they have herpes because they do not have any symptoms.  Herpes cannot be cured. But the disease usually causes most problems during the first few years. After that, the virus is still there, but it causes few to no symptoms. Even when the virus is active, people with herpes can take medicines to reduce and help prevent symptoms.  What are the symptoms of herpes? -- Some people with herpes never have any symptoms. But other people can develop symptoms within a few weeks of being infected with the herpes virus.  Symptoms usually include blisters in the genital area. In women, this area includes the vagina, butt, anus, or thighs. In men, this area includes the penis, scrotum, anus, butt, or thighs. The blisters can become painful open sores, which then crust over as they heal.  Sometimes, people can have other symptoms that include:  Blisters on the mouth or lips  Fever, headache, or pain in the joints  Trouble urinating  The first time you have symptoms is usually the worst, and can last as long as 2 to 3 weeks. After that, symptoms come and go. A return of symptoms is often called an "outbreak." Outbreaks usually include blisters and open sores in the genital area. Outbreaks that happen after the first time are usually not as severe and do not last as long.  Outbreaks might occur every month or more often, or just once or twice a year. Sometimes, people can tell when an outbreak will occur, because they feel itching or pain beforehand. Sometimes they do not know that an outbreak is coming because they have no symptoms. " "Whatever your pattern is, keep in mind that herpes outbreaks usually become less frequent over time as you get older.  Certain things, called "triggers," can make outbreaks more likely to occur. These include stress, sunlight, menstrual periods, or getting sick.  Is there a test for herpes? -- Yes. If you have blisters or sores when your doctor or nurse examines you, they can order a test to look for herpes. There are a few different tests that can do this. For all of them, the doctor or nurse takes a sample of cells or fluid from a sore and sends it to the lab. If you don't have symptoms when your doctor or nurse examines you, they will sometimes take a blood sample. This can show them if you have been exposed to the virus.  Should I see a doctor or nurse? -- Yes. You should see your doctor or nurse if you have symptoms of genital herpes.  How is herpes treated? -- Your doctor can prescribe different medicines to help reduce symptoms and speed up the healing of an outbreak. These medicines work best when people start them soon after an outbreak starts. You and your doctor should work together to decide which medicine is right for you.  Is there anything I can do on my own to feel better? -- Yes. To reduce the pain during an outbreak, you can:  Use a portable bath (such as a "Sitz bath") where you can sit in warm water for about 20 minutes. Your bathtub could also work. Avoid bubble baths.  Keep the genital area clean and dry, and avoid tight clothes.  Take over-the-counter pain medicine such as acetaminophen (brand name: Tylenol) or ibuprofen (sample brand names: Advil, Motrin). But avoid aspirin.  You should also let your doctor or nurse know if you are worried or upset about your herpes. He or she can talk with you about your feelings. Plus, you might want to join a support group for people with herpes. You can also call the Centers for Disease Control and Prevention (CDC) STD hotline at 1-628.696.8262 for " help.  What if I am pregnant? -- If you are pregnant, talk with your doctor. It is possible for a baby to get herpes from its mother during birth, especially if the mother's first outbreak starts near the time of delivery. Talk with your doctor or nurse about things you can do to help prevent this.  Can future outbreaks of symptoms be prevented? -- Some people with herpes take a medicine every day to help prevent future outbreaks.  What can I do to prevent spreading herpes to my sex partner? -- You are most likely to spread herpes to a sex partner when you have blisters and open sores on your body. But it's also possible to spread herpes to your partner when you do not have any symptoms. That is because herpes can be present on your body without causing any symptoms, like blisters or pain.   Telling your sex partner that you have herpes can be hard. But it can help protect them, since there are ways to lower the risk of spreading the infection. The best ways to do this are:  Using a condom every time you have sex  Not having sex when you have symptoms  Not having oral sex if you have blisters or open sores (in the genital area or around your mouth)  You might also be able to decrease the risk of spreading herpes to your partner by taking an antiviral medicine every day. Your doctor or nurse can talk to you about whether this is an option for you.   All topics are updated as new evidence becomes available and our peer review process is complete.  This topic retrieved from [employer] on: Sep 21, 2021.  Topic 54823 Version 10.0  Release: 29.4.2 - C29.263  © 2021 UpToDate, Inc. and/or its affiliates. All rights reserved.  Consumer Information Use and Disclaimer   This information is not specific medical advice and does not replace information you receive from your health care provider. This is only a brief summary of general information. It does NOT include all information about conditions, illnesses, injuries, tests,  procedures, treatments, therapies, discharge instructions or life-style choices that may apply to you. You must talk with your health care provider for complete information about your health and treatment options. This information should not be used to decide whether or not to accept your health care provider's advice, instructions or recommendations. Only your health care provider has the knowledge and training to provide advice that is right for you. The use of this information is governed by the [employer] End User License Agreement, available at https://www.[employer]eb[employer].TV/en/solutions/[employer]/[email or web address]/adrianna.The use of [employer] content is governed by the [employer] Terms of Use. ©2021 [employer] All rights reserved.  Copyright   © 2021 UpToDate, Inc. and/or its affiliates. All rights reserved.

## 2022-11-16 NOTE — PROCEDURES
Biopsy (Gynecological)    Date/Time: 11/16/2022 1:30 PM  Performed by: Amber Brandon MD  Authorized by: Amber Brandon MD     Consent Done?:  Yes (Written)   Patient was prepped and draped in the normal sterile fashion.  Local anesthesia used?: No      Biopsy Location:  Uterus  Estimated blood loss (cc):  10   Patient tolerated the procedure well with no immediate complications.     Pelvic rest for 2 weeks. Bleeding, pain and fever precautions given.       Patient reports cycles occur every 4 weeks lasting 5-7 days using 2-3 overnight pads per day with clotting.   She denies any BTB.     Narrative & Impression  EXAMINATION:  US PELVIS COMP WITH TRANSVAG NON-OB (XPD)     CLINICAL HISTORY:  Excessive and frequent menstruation with regular cycle     TECHNIQUE:  Transabdominal sonography of the pelvis was performed, followed by transvaginal sonography to better evaluate the uterus and ovaries.     COMPARISON:  None     FINDINGS:  Uterus: Size: 14.3 x 5.3 x 8.3-cm  Masses: At least 3 fibroids are present near the fundus, the largest of which is in the anterior wall and measures 4.6 x 4.0 x 4.1.  Endometrium: Normal in this pre-menopausal patient, measuring 8-mm.  Right ovary: Size: 3.5 x 2.3 x 2.6-cm  Appearance: Normal  Vascular flow: Normal.  Left ovary: Could not be identified with confidence.  Free Fluid: None.     Impression:     1. Three large uterine fibroids near the fundus, the largest of which measures 4.6 cm.  2. Nonvisualization of the left ovary.  Otherwise unremarkable.        Electronically signed by: Aida Simmons  Date:                                            08/30/2022  Time:                                           08:50      Patient counseled in detail on options available for menorrhagia and uterine fibroids. Conservative therapy, medical therapy and surgical options discussed in detail along with patient's medical history.  Patient desires to proceed with conservative therapy at  this time.     RTC in 6 months   Labs done  HSV titers discussed un detail.

## 2022-11-28 LAB
FINAL PATHOLOGIC DIAGNOSIS: NORMAL
GROSS: NORMAL
Lab: NORMAL

## 2022-12-12 ENCOUNTER — TELEPHONE (OUTPATIENT)
Dept: OBSTETRICS AND GYNECOLOGY | Facility: CLINIC | Age: 42
End: 2022-12-12
Payer: COMMERCIAL

## 2022-12-12 NOTE — TELEPHONE ENCOUNTER
----- Message from Amebr Brandon MD sent at 12/10/2022  8:18 AM CST -----  Please call patient to inform her of benign endometrial biopsy.  Please assure that patient keeps her follow-up appointment if scheduled or that she has a follow up appointment in 3 months if therapy has been initiated.  If patient has further questions, please forward information to me.

## 2023-03-27 ENCOUNTER — LAB VISIT (OUTPATIENT)
Dept: PRIMARY CARE CLINIC | Facility: CLINIC | Age: 43
End: 2023-03-27
Payer: COMMERCIAL

## 2023-03-27 ENCOUNTER — OFFICE VISIT (OUTPATIENT)
Dept: PRIMARY CARE CLINIC | Facility: CLINIC | Age: 43
End: 2023-03-27
Payer: COMMERCIAL

## 2023-03-27 VITALS
HEIGHT: 62 IN | SYSTOLIC BLOOD PRESSURE: 105 MMHG | OXYGEN SATURATION: 100 % | WEIGHT: 221.69 LBS | BODY MASS INDEX: 40.8 KG/M2 | DIASTOLIC BLOOD PRESSURE: 66 MMHG | HEART RATE: 87 BPM

## 2023-03-27 DIAGNOSIS — Z23 IMMUNIZATION DUE: ICD-10-CM

## 2023-03-27 DIAGNOSIS — E78.2 MIXED DYSLIPIDEMIA: ICD-10-CM

## 2023-03-27 DIAGNOSIS — E66.01 CLASS 2 SEVERE OBESITY DUE TO EXCESS CALORIES WITH SERIOUS COMORBIDITY AND BODY MASS INDEX (BMI) OF 38.0 TO 38.9 IN ADULT: ICD-10-CM

## 2023-03-27 DIAGNOSIS — Z13.1 DIABETES MELLITUS SCREENING: ICD-10-CM

## 2023-03-27 DIAGNOSIS — M10.9 GOUT, UNSPECIFIED CAUSE, UNSPECIFIED CHRONICITY, UNSPECIFIED SITE: ICD-10-CM

## 2023-03-27 DIAGNOSIS — Z13.1 DIABETES MELLITUS SCREENING: Primary | ICD-10-CM

## 2023-03-27 DIAGNOSIS — M10.9 GOUT, ARTHROPATHY: ICD-10-CM

## 2023-03-27 LAB
ALBUMIN SERPL BCP-MCNC: 3.6 G/DL (ref 3.5–5.2)
ALP SERPL-CCNC: 68 U/L (ref 55–135)
ALT SERPL W/O P-5'-P-CCNC: 32 U/L (ref 10–44)
ANION GAP SERPL CALC-SCNC: 7 MMOL/L (ref 8–16)
AST SERPL-CCNC: 25 U/L (ref 10–40)
BASOPHILS # BLD AUTO: 0.05 K/UL (ref 0–0.2)
BASOPHILS NFR BLD: 0.8 % (ref 0–1.9)
BILIRUB SERPL-MCNC: 0.4 MG/DL (ref 0.1–1)
BUN SERPL-MCNC: 6 MG/DL (ref 6–20)
CALCIUM SERPL-MCNC: 9 MG/DL (ref 8.7–10.5)
CHLORIDE SERPL-SCNC: 105 MMOL/L (ref 95–110)
CHOLEST SERPL-MCNC: 144 MG/DL (ref 120–199)
CHOLEST/HDLC SERPL: 3.9 {RATIO} (ref 2–5)
CO2 SERPL-SCNC: 26 MMOL/L (ref 23–29)
CREAT SERPL-MCNC: 0.7 MG/DL (ref 0.5–1.4)
DIFFERENTIAL METHOD: ABNORMAL
EOSINOPHIL # BLD AUTO: 0.2 K/UL (ref 0–0.5)
EOSINOPHIL NFR BLD: 2.4 % (ref 0–8)
ERYTHROCYTE [DISTWIDTH] IN BLOOD BY AUTOMATED COUNT: 14.8 % (ref 11.5–14.5)
EST. GFR  (NO RACE VARIABLE): >60 ML/MIN/1.73 M^2
GLUCOSE SERPL-MCNC: 77 MG/DL (ref 70–110)
HCT VFR BLD AUTO: 37 % (ref 37–48.5)
HDLC SERPL-MCNC: 37 MG/DL (ref 40–75)
HDLC SERPL: 25.7 % (ref 20–50)
HGB BLD-MCNC: 12.1 G/DL (ref 12–16)
IMM GRANULOCYTES # BLD AUTO: 0.02 K/UL (ref 0–0.04)
IMM GRANULOCYTES NFR BLD AUTO: 0.3 % (ref 0–0.5)
LDLC SERPL CALC-MCNC: 86 MG/DL (ref 63–159)
LYMPHOCYTES # BLD AUTO: 2.8 K/UL (ref 1–4.8)
LYMPHOCYTES NFR BLD: 45 % (ref 18–48)
MCH RBC QN AUTO: 27.1 PG (ref 27–31)
MCHC RBC AUTO-ENTMCNC: 32.7 G/DL (ref 32–36)
MCV RBC AUTO: 83 FL (ref 82–98)
MONOCYTES # BLD AUTO: 0.7 K/UL (ref 0.3–1)
MONOCYTES NFR BLD: 10.5 % (ref 4–15)
NEUTROPHILS # BLD AUTO: 2.6 K/UL (ref 1.8–7.7)
NEUTROPHILS NFR BLD: 41 % (ref 38–73)
NONHDLC SERPL-MCNC: 107 MG/DL
NRBC BLD-RTO: 0 /100 WBC
PLATELET # BLD AUTO: 283 K/UL (ref 150–450)
PMV BLD AUTO: 12.4 FL (ref 9.2–12.9)
POTASSIUM SERPL-SCNC: 3.7 MMOL/L (ref 3.5–5.1)
PROT SERPL-MCNC: 7.1 G/DL (ref 6–8.4)
RBC # BLD AUTO: 4.47 M/UL (ref 4–5.4)
SODIUM SERPL-SCNC: 138 MMOL/L (ref 136–145)
TRIGL SERPL-MCNC: 105 MG/DL (ref 30–150)
URATE SERPL-MCNC: 7.2 MG/DL (ref 2.4–5.7)
WBC # BLD AUTO: 6.27 K/UL (ref 3.9–12.7)

## 2023-03-27 PROCEDURE — 90715 TDAP VACCINE 7 YRS/> IM: CPT | Mod: S$GLB,,, | Performed by: STUDENT IN AN ORGANIZED HEALTH CARE EDUCATION/TRAINING PROGRAM

## 2023-03-27 PROCEDURE — 36415 COLL VENOUS BLD VENIPUNCTURE: CPT | Performed by: STUDENT IN AN ORGANIZED HEALTH CARE EDUCATION/TRAINING PROGRAM

## 2023-03-27 PROCEDURE — 3078F DIAST BP <80 MM HG: CPT | Mod: CPTII,S$GLB,, | Performed by: STUDENT IN AN ORGANIZED HEALTH CARE EDUCATION/TRAINING PROGRAM

## 2023-03-27 PROCEDURE — 99396 PR PREVENTIVE VISIT,EST,40-64: ICD-10-PCS | Mod: 25,S$GLB,, | Performed by: STUDENT IN AN ORGANIZED HEALTH CARE EDUCATION/TRAINING PROGRAM

## 2023-03-27 PROCEDURE — 3044F HG A1C LEVEL LT 7.0%: CPT | Mod: CPTII,S$GLB,, | Performed by: STUDENT IN AN ORGANIZED HEALTH CARE EDUCATION/TRAINING PROGRAM

## 2023-03-27 PROCEDURE — 3078F PR MOST RECENT DIASTOLIC BLOOD PRESSURE < 80 MM HG: ICD-10-PCS | Mod: CPTII,S$GLB,, | Performed by: STUDENT IN AN ORGANIZED HEALTH CARE EDUCATION/TRAINING PROGRAM

## 2023-03-27 PROCEDURE — 3074F PR MOST RECENT SYSTOLIC BLOOD PRESSURE < 130 MM HG: ICD-10-PCS | Mod: CPTII,S$GLB,, | Performed by: STUDENT IN AN ORGANIZED HEALTH CARE EDUCATION/TRAINING PROGRAM

## 2023-03-27 PROCEDURE — 80053 COMPREHEN METABOLIC PANEL: CPT | Performed by: STUDENT IN AN ORGANIZED HEALTH CARE EDUCATION/TRAINING PROGRAM

## 2023-03-27 PROCEDURE — 99396 PREV VISIT EST AGE 40-64: CPT | Mod: 25,S$GLB,, | Performed by: STUDENT IN AN ORGANIZED HEALTH CARE EDUCATION/TRAINING PROGRAM

## 2023-03-27 PROCEDURE — 84550 ASSAY OF BLOOD/URIC ACID: CPT | Performed by: STUDENT IN AN ORGANIZED HEALTH CARE EDUCATION/TRAINING PROGRAM

## 2023-03-27 PROCEDURE — 3008F PR BODY MASS INDEX (BMI) DOCUMENTED: ICD-10-PCS | Mod: CPTII,S$GLB,, | Performed by: STUDENT IN AN ORGANIZED HEALTH CARE EDUCATION/TRAINING PROGRAM

## 2023-03-27 PROCEDURE — 3044F PR MOST RECENT HEMOGLOBIN A1C LEVEL <7.0%: ICD-10-PCS | Mod: CPTII,S$GLB,, | Performed by: STUDENT IN AN ORGANIZED HEALTH CARE EDUCATION/TRAINING PROGRAM

## 2023-03-27 PROCEDURE — 85025 COMPLETE CBC W/AUTO DIFF WBC: CPT | Performed by: STUDENT IN AN ORGANIZED HEALTH CARE EDUCATION/TRAINING PROGRAM

## 2023-03-27 PROCEDURE — 90471 TDAP VACCINE GREATER THAN OR EQUAL TO 7YO IM: ICD-10-PCS | Mod: S$GLB,,, | Performed by: STUDENT IN AN ORGANIZED HEALTH CARE EDUCATION/TRAINING PROGRAM

## 2023-03-27 PROCEDURE — 99999 PR PBB SHADOW E&M-EST. PATIENT-LVL III: CPT | Mod: PBBFAC,,, | Performed by: STUDENT IN AN ORGANIZED HEALTH CARE EDUCATION/TRAINING PROGRAM

## 2023-03-27 PROCEDURE — 99999 PR PBB SHADOW E&M-EST. PATIENT-LVL III: ICD-10-PCS | Mod: PBBFAC,,, | Performed by: STUDENT IN AN ORGANIZED HEALTH CARE EDUCATION/TRAINING PROGRAM

## 2023-03-27 PROCEDURE — 3074F SYST BP LT 130 MM HG: CPT | Mod: CPTII,S$GLB,, | Performed by: STUDENT IN AN ORGANIZED HEALTH CARE EDUCATION/TRAINING PROGRAM

## 2023-03-27 PROCEDURE — 90471 IMMUNIZATION ADMIN: CPT | Mod: S$GLB,,, | Performed by: STUDENT IN AN ORGANIZED HEALTH CARE EDUCATION/TRAINING PROGRAM

## 2023-03-27 PROCEDURE — 83036 HEMOGLOBIN GLYCOSYLATED A1C: CPT | Performed by: STUDENT IN AN ORGANIZED HEALTH CARE EDUCATION/TRAINING PROGRAM

## 2023-03-27 PROCEDURE — 3008F BODY MASS INDEX DOCD: CPT | Mod: CPTII,S$GLB,, | Performed by: STUDENT IN AN ORGANIZED HEALTH CARE EDUCATION/TRAINING PROGRAM

## 2023-03-27 PROCEDURE — 80061 LIPID PANEL: CPT | Performed by: STUDENT IN AN ORGANIZED HEALTH CARE EDUCATION/TRAINING PROGRAM

## 2023-03-27 PROCEDURE — 90715 TDAP VACCINE GREATER THAN OR EQUAL TO 7YO IM: ICD-10-PCS | Mod: S$GLB,,, | Performed by: STUDENT IN AN ORGANIZED HEALTH CARE EDUCATION/TRAINING PROGRAM

## 2023-03-27 NOTE — PROGRESS NOTES
03/28/2023    Gayatri Henderson  [id number]    CC: est care    HPI    This pt is new to me and presents to est care. PMH for gout and elevated lipoprotein A.       Elevated Lipoprotein A  Has been walking, exercising and trying to change diet. Also trying drink more water.   Did not start lipitor or ASA recommended by cardiology    Gout  Most recent flare one week ago and previous to that sept.  Takes colchicine and indomethacin; needs a refill    obesity  Eating between noon and 8 pm and weight training      Negative 10 point ROS outside of HPI    Social History     Socioeconomic History    Marital status: Single   Tobacco Use    Smoking status: Never    Smokeless tobacco: Never   Substance and Sexual Activity    Alcohol use: Not Currently     Comment: socially    Drug use: Never    Sexual activity: Not Currently           Current Outpatient Medications:     colchicine (MITIGARE) 0.6 mg Cap, Take 2 tablets at onset of flare, may repeat dose in 1 hour on day 1, day #2 may take 1 tablet every 12 hours until flare resolves, Disp: 30 capsule, Rfl: 0    INDOMETHACIN ORAL, , Disp: , Rfl:     loratadine (CLARITIN) 10 mg tablet, Take 1 tablet (10 mg total) by mouth once daily., Disp: 30 tablet, Rfl: 0      Physical Exam  Vitals:    03/27/23 1311   BP: 105/66   Pulse: 87       GEN: NAD, AAox3, well nourished  HEENT: NCAT, EOMI, PEERL, no scleral injection, TM normal, moist mucous membranes, oropharynx clear, no erythema, no exudates  NECK: full rom, no cervical lymphadenopathy, no thyroidmegally  CV: RRR, no m/r/g, trace LE edema  LUNGS: CTAB, non-labored breathing, no wheezes, no crackles  ABD: soft, non-distended, no rebound/guarding, no organomegaly  EXT: n c/c/e, warm, 5/5 UE and LE strength  NEURO: CNII-XII intact no focal deficit  PSYCH: nl affect, no hallucinations, nl speech  Skin: intact, no rashes/lesions/erythema      1. Diabetes mellitus screening  - Hemoglobin A1C; Future    2. Mixed dyslipidemia  - Lipid  Panel; Future  - LIPOPROTEIN A (LPA); Future    3. Gout, unspecified cause, unspecified chronicity, unspecified site  - CBC Auto Differential; Future  - Comprehensive Metabolic Panel; Future  - URIC ACID; Future  -indocin and colchicine refilled     4. Immunization due  - (In Office Administered) Tdap Vaccine    5. Class 2 severe obesity due to excess calories with serious comorbidity and body mass index (BMI) of 38.0 to 38.9 in adult  Discussed changing lifestyle including getting at least 30 min. of moderate intensity exercise 3-4 week. Limit processed/packaged foods. Increasing water intake and having a normal sleep schedule of at least 7 hours per night.      RTC in one year sooner as needed    Sarah Green MD  Family Medicine

## 2023-03-28 LAB
ESTIMATED AVG GLUCOSE: 111 MG/DL (ref 68–131)
HBA1C MFR BLD: 5.5 % (ref 4–5.6)

## 2023-03-28 RX ORDER — COLCHICINE 0.6 MG/1
CAPSULE ORAL
Qty: 30 CAPSULE | Refills: 2 | Status: SHIPPED | OUTPATIENT
Start: 2023-03-28

## 2023-03-28 RX ORDER — INDOMETHACIN 50 MG/1
50 CAPSULE ORAL
Qty: 60 CAPSULE | Refills: 1 | Status: SHIPPED | OUTPATIENT
Start: 2023-03-28

## 2023-06-16 DIAGNOSIS — Z12.31 OTHER SCREENING MAMMOGRAM: ICD-10-CM

## 2023-08-25 NOTE — PROGRESS NOTES
"Subjective:       Patient ID: Gayatri Henderson is a 41 y.o. female.    Chief Complaint: Follow-up    Pt new to me, here for, "recent visiit from the ED about pneumonia called pt n answer, left detailed vm, also called pt backupumber no answer."    Pt had COVID 12/23. Dx with Pneumonia on 1/4, went through 3 rounds of antibiotics per pt. On 1/11/22 dx with bilateral PE. Started on Eliquis. Saw Hem/Onc Dr. Braxton on 1/24.    Needs refills on Eliquis was told to stay on it for 3 months. Has f/u with Hem/Onc on 4/25. No complaints wants to make sure pneumonia has cleared before returning to work on tomorrow, needs work note.    Appears pt saw Dr. Vargas on 1-18-22 same issue. Plan of care that visit was the following:  Plan:  Gayatri was seen today for follow-up.     Diagnoses and all orders for this visit:     Bilateral pulmonary embolism  -     Ambulatory referral/consult to Hematology / Oncology; Future for hypercoagulability work up.  -     Continue Eliquis for at least 3 months, reviewed ER precautions, f/u with cardiology as scheduled, do not discontinue Eliquis without physician approval, to notify me if needing refill prior to cardiology visit or if she can establish care with a provider who is accepting new patients can f/u with them as needed     Multifocal pneumonia  -     X-Ray Chest PA And Lateral; Future  -     Culture, Respiratory with Gram Stain; treated with Levaquin while hospitalized, reassess CXR and sputum culture     Productive cough  -     X-Ray Chest PA And Lateral; Future  -     Culture, Respiratory with Gram Stain  -     Start benzonatate (TESSALON) 100 MG capsule; Take 1 capsule (100 mg total) by mouth 3 (three) times daily as needed for Cough.  Instructed patient to notify me if no improvement.     Family history of blood clots  -     Ambulatory referral/consult to Hematology / Oncology; Future for hypercoagulability work up.      Recommended establish care appointment with a provider " who's taking new patients as I'm currently over my patient cap and am not accepting further new patients at this time.  Work note for 1/11/22 - present provided.  Recommend sending LA papers to PCP once established or can send to me if unable to establish in a timely manner.      Review of Systems   Constitutional: Negative for activity change, appetite change, chills, diaphoresis, fatigue, fever and unexpected weight change.   Eyes: Negative for visual disturbance.   Respiratory: Positive for cough. Negative for chest tightness, shortness of breath, wheezing and stridor.    Cardiovascular: Negative for chest pain, palpitations, leg swelling and claudication.   Gastrointestinal: Negative for abdominal pain, blood in stool, diarrhea, nausea and vomiting.   Genitourinary: Negative for dysuria.   Musculoskeletal: Negative for arthralgias and myalgias.   Allergic/Immunologic: Negative for environmental allergies, food allergies and immunocompromised state.   Neurological: Negative for dizziness, weakness, light-headedness, numbness and headaches.   Hematological: Negative for adenopathy. Does not bruise/bleed easily.   Psychiatric/Behavioral: Negative for suicidal ideas.        Review of patient's allergies indicates:  No Known Allergies      Current Outpatient Medications:     apixaban (ELIQUIS) 5 mg Tab, Take 1 tablet (5 mg total) by mouth 2 (two) times daily., Disp: 180 tablet, Rfl: 0    loratadine (CLARITIN) 10 mg tablet, Take 1 tablet (10 mg total) by mouth once daily., Disp: 30 tablet, Rfl: 0    Patient Active Problem List   Diagnosis    Chest pain on exertion    Bilateral pulmonary embolism    Constipation    Multifocal pneumonia    Leukocytosis     Past Medical History:   Diagnosis Date    Allergy      Past Surgical History:   Procedure Laterality Date    WISDOM TOOTH EXTRACTION      ?late 20s     Social History     Socioeconomic History    Marital status: Single   Tobacco Use    Smoking status:  "Never Smoker    Smokeless tobacco: Never Used   Substance and Sexual Activity    Alcohol use: Yes     Comment: socially    Drug use: Never    Sexual activity: Not Currently     Family History   Problem Relation Age of Onset    Deep vein thrombosis Mother     No Known Problems Father     Breast cancer Maternal Aunt     Cancer Maternal Uncle            Objective:       Vitals:    02/14/22 1432   BP: 106/62   Pulse: 99   Weight: 97.6 kg (215 lb 2.7 oz)   Height: 5' 2" (1.575 m)   PainSc: 0-No pain     Body mass index is 39.35 kg/m².    Physical Exam  Vitals and nursing note reviewed.   Constitutional:       Appearance: She is obese.   HENT:      Head: Normocephalic.      Nose: Nose normal.      Mouth/Throat:      Mouth: Mucous membranes are moist.      Pharynx: Oropharynx is clear.   Eyes:      Conjunctiva/sclera: Conjunctivae normal.   Cardiovascular:      Rate and Rhythm: Normal rate and regular rhythm.      Pulses: Normal pulses.      Heart sounds: Normal heart sounds.   Pulmonary:      Effort: Pulmonary effort is normal.      Breath sounds: Normal breath sounds.   Musculoskeletal:         General: Normal range of motion.      Cervical back: Normal range of motion.   Skin:     General: Skin is warm and dry.      Capillary Refill: Capillary refill takes less than 2 seconds.   Neurological:      General: No focal deficit present.      Mental Status: She is alert and oriented to person, place, and time.   Psychiatric:         Mood and Affect: Mood normal.         Behavior: Behavior normal.         Thought Content: Thought content normal.         Judgment: Judgment normal.         Assessment:       Problem List Items Addressed This Visit        Pulmonary    Multifocal pneumonia - Primary    Relevant Orders    X-Ray Chest PA And Lateral       Hematology    Bilateral pulmonary embolism    Relevant Medications    apixaban (ELIQUIS) 5 mg Tab      Other Visit Diagnoses     History of COVID-19        Relevant " Medications    apixaban (ELIQUIS) 5 mg Tab    Other Relevant Orders    X-Ray Chest PA And Lateral    BMI 39.0-39.9,adult        Obesity (BMI 35.0-39.9 without comorbidity)              Plan:       Gayatri was seen today for follow-up.    Diagnoses and all orders for this visit:    Multifocal pneumonia  -     X-Ray Chest PA And Lateral; Future    Bilateral pulmonary embolism  -     apixaban (ELIQUIS) 5 mg Tab; Take 1 tablet (5 mg total) by mouth 2 (two) times daily.    History of COVID-19  -     apixaban (ELIQUIS) 5 mg Tab; Take 1 tablet (5 mg total) by mouth 2 (two) times daily.  -     X-Ray Chest PA And Lateral; Future    BMI 39.0-39.9,adult  BMI reviewed    Obesity (BMI 35.0-39.9 without comorbidity)  BMI reviewed.    Diet and exercise to lose weight.    Recheck Chest Xray today, will message with results    Refilled Eliquis for 90 more days, take 2 times a day    Keep follow up with Hematology as scheduled 4-25-22 with  Dr. Braxton    Work note given    Keep follow up on 3-30-22 with Dr. Martino to establish PCP as scheduled    Follow up in about 6 weeks (around 3/30/2022) for with Dr. Martino as scheduled.             Electrodesiccation Text: The wound bed was treated with electrodesiccation after the biopsy was performed.

## 2024-05-16 ENCOUNTER — HOSPITAL ENCOUNTER (EMERGENCY)
Facility: OTHER | Age: 44
Discharge: HOME | End: 2024-05-17
Attending: INTERNAL MEDICINE

## 2024-05-16 DIAGNOSIS — R06.02 SHORTNESS OF BREATH: ICD-10-CM

## 2024-05-16 DIAGNOSIS — R07.9 CHEST PAIN: Primary | ICD-10-CM

## 2024-05-16 LAB
ALBUMIN SERPL BCP-MCNC: 3.6 G/DL (ref 3.5–5.2)
ALP SERPL-CCNC: 75 U/L (ref 55–135)
ALT SERPL W/O P-5'-P-CCNC: 17 U/L (ref 10–44)
ANION GAP SERPL CALC-SCNC: 12 MMOL/L (ref 8–16)
AST SERPL-CCNC: 20 U/L (ref 10–40)
BASOPHILS # BLD AUTO: 0.02 K/UL (ref 0–0.2)
BASOPHILS NFR BLD: 0.3 % (ref 0–1.9)
BILIRUB SERPL-MCNC: 0.3 MG/DL (ref 0.1–1)
BNP SERPL-MCNC: <10 PG/ML (ref 0–99)
BUN SERPL-MCNC: 8 MG/DL (ref 6–20)
CALCIUM SERPL-MCNC: 9 MG/DL (ref 8.7–10.5)
CHLORIDE SERPL-SCNC: 103 MMOL/L (ref 95–110)
CO2 SERPL-SCNC: 22 MMOL/L (ref 23–29)
CREAT SERPL-MCNC: 0.8 MG/DL (ref 0.5–1.4)
CTP QC/QA: YES
CTP QC/QA: YES
D DIMER PPP IA.FEU-MCNC: 0.86 MG/L FEU
DIFFERENTIAL METHOD BLD: ABNORMAL
EOSINOPHIL # BLD AUTO: 0.1 K/UL (ref 0–0.5)
EOSINOPHIL NFR BLD: 1.9 % (ref 0–8)
ERYTHROCYTE [DISTWIDTH] IN BLOOD BY AUTOMATED COUNT: 15.6 % (ref 11.5–14.5)
EST. GFR  (NO RACE VARIABLE): >60 ML/MIN/1.73 M^2
GLUCOSE SERPL-MCNC: 92 MG/DL (ref 70–110)
HCT VFR BLD AUTO: 35.8 % (ref 37–48.5)
HGB BLD-MCNC: 11.9 G/DL (ref 12–16)
IMM GRANULOCYTES # BLD AUTO: 0.01 K/UL (ref 0–0.04)
IMM GRANULOCYTES NFR BLD AUTO: 0.1 % (ref 0–0.5)
LYMPHOCYTES # BLD AUTO: 3.4 K/UL (ref 1–4.8)
LYMPHOCYTES NFR BLD: 44.8 % (ref 18–48)
MCH RBC QN AUTO: 26.2 PG (ref 27–31)
MCHC RBC AUTO-ENTMCNC: 33.2 G/DL (ref 32–36)
MCV RBC AUTO: 79 FL (ref 82–98)
MONOCYTES # BLD AUTO: 0.7 K/UL (ref 0.3–1)
MONOCYTES NFR BLD: 9.5 % (ref 4–15)
NEUTROPHILS # BLD AUTO: 3.3 K/UL (ref 1.8–7.7)
NEUTROPHILS NFR BLD: 43.4 % (ref 38–73)
NRBC BLD-RTO: 0 /100 WBC
PLATELET # BLD AUTO: 373 K/UL (ref 150–450)
PMV BLD AUTO: 11.5 FL (ref 9.2–12.9)
POC MOLECULAR INFLUENZA A AGN: NEGATIVE
POC MOLECULAR INFLUENZA B AGN: NEGATIVE
POTASSIUM SERPL-SCNC: 4.1 MMOL/L (ref 3.5–5.1)
PROT SERPL-MCNC: 7.8 G/DL (ref 6–8.4)
RBC # BLD AUTO: 4.55 M/UL (ref 4–5.4)
SARS-COV-2 RDRP RESP QL NAA+PROBE: NEGATIVE
SODIUM SERPL-SCNC: 137 MMOL/L (ref 136–145)
TROPONIN I SERPL DL<=0.01 NG/ML-MCNC: <0.006 NG/ML (ref 0–0.03)
WBC # BLD AUTO: 7.5 K/UL (ref 3.9–12.7)

## 2024-05-16 PROCEDURE — 85025 COMPLETE CBC W/AUTO DIFF WBC: CPT | Performed by: INTERNAL MEDICINE

## 2024-05-16 PROCEDURE — 85379 FIBRIN DEGRADATION QUANT: CPT | Performed by: INTERNAL MEDICINE

## 2024-05-16 PROCEDURE — 87635 SARS-COV-2 COVID-19 AMP PRB: CPT | Performed by: INTERNAL MEDICINE

## 2024-05-16 PROCEDURE — 80053 COMPREHEN METABOLIC PANEL: CPT | Performed by: INTERNAL MEDICINE

## 2024-05-16 PROCEDURE — 93010 ELECTROCARDIOGRAM REPORT: CPT | Mod: ,,, | Performed by: INTERNAL MEDICINE

## 2024-05-16 PROCEDURE — 87389 HIV-1 AG W/HIV-1&-2 AB AG IA: CPT | Performed by: INTERNAL MEDICINE

## 2024-05-16 PROCEDURE — 99285 EMERGENCY DEPT VISIT HI MDM: CPT | Mod: 25

## 2024-05-16 PROCEDURE — 93005 ELECTROCARDIOGRAM TRACING: CPT

## 2024-05-16 PROCEDURE — 86803 HEPATITIS C AB TEST: CPT | Performed by: INTERNAL MEDICINE

## 2024-05-16 PROCEDURE — 84484 ASSAY OF TROPONIN QUANT: CPT | Performed by: INTERNAL MEDICINE

## 2024-05-16 PROCEDURE — 81025 URINE PREGNANCY TEST: CPT | Performed by: INTERNAL MEDICINE

## 2024-05-16 PROCEDURE — 83880 ASSAY OF NATRIURETIC PEPTIDE: CPT | Performed by: INTERNAL MEDICINE

## 2024-05-17 VITALS
HEIGHT: 62 IN | WEIGHT: 218 LBS | DIASTOLIC BLOOD PRESSURE: 56 MMHG | TEMPERATURE: 98 F | OXYGEN SATURATION: 100 % | HEART RATE: 72 BPM | BODY MASS INDEX: 40.12 KG/M2 | RESPIRATION RATE: 17 BRPM | SYSTOLIC BLOOD PRESSURE: 102 MMHG

## 2024-05-17 LAB
B-HCG UR QL: NEGATIVE
CTP QC/QA: YES
HCV AB SERPL QL IA: NEGATIVE
HIV 1+2 AB+HIV1 P24 AG SERPL QL IA: NEGATIVE

## 2024-05-17 PROCEDURE — 25500020 PHARM REV CODE 255: Performed by: INTERNAL MEDICINE

## 2024-05-17 RX ADMIN — IOHEXOL 100 ML: 350 INJECTION, SOLUTION INTRAVENOUS at 12:05

## 2024-05-17 NOTE — DISCHARGE INSTRUCTIONS
Diagnosis:   1. Shortness of breath    2. Chest pain        Tests you had showed:   Labs Reviewed   CBC W/ AUTO DIFFERENTIAL - Abnormal; Notable for the following components:       Result Value    Hemoglobin 11.9 (*)     Hematocrit 35.8 (*)     MCV 79 (*)     MCH 26.2 (*)     RDW 15.6 (*)     All other components within normal limits   COMPREHENSIVE METABOLIC PANEL - Abnormal; Notable for the following components:    CO2 22 (*)     All other components within normal limits   D DIMER, QUANTITATIVE - Abnormal; Notable for the following components:    D-Dimer 0.86 (*)     All other components within normal limits   TROPONIN I   B-TYPE NATRIURETIC PEPTIDE   HIV 1 / 2 ANTIBODY    Narrative:     Release to patient->Immediate   HEPATITIS C ANTIBODY    Narrative:     Release to patient->Immediate   POCT URINE PREGNANCY   SARS-COV-2 RDRP GENE   POCT INFLUENZA A/B MOLECULAR      CTA Chest Non-Coronary (PE Studies)   Final Result      No acute pulmonary thromboemboli.            Electronically signed by: Marco Wallace MD   Date:    05/17/2024   Time:    01:19      X-Ray Chest AP Portable   Final Result      No acute findings in the chest.         Electronically signed by: Marco Wallace MD   Date:    05/17/2024   Time:    00:54          Treatments you received were:   Medications   iohexoL (OMNIPAQUE 350) injection 100 mL (100 mLs Intravenous Given 5/17/24 0053)       Home Care Instructions:  - Medications: Continue taking your home medications as prescribed    Follow-Up Plan:  - Follow-up with: Primary care doctor within 1-2  days  - Additional testing and/or evaluation will be directed by your primary doctor    Return to the Emergency Department for symptoms including but not limited to: worsening symptoms, severe back pain, shortness of breath or chest pain, vomiting with inability to hold down fluids, blood in vomit or poop, fevers greater than 100.4°F, passing out/fainting/unconsciousness, or other concerning symptoms.      Future Appointments   Date Time Provider Department Center   7/16/2024  2:00 PM Amber Brandon MD [facility]

## 2024-05-17 NOTE — ED PROVIDER NOTES
Encounter date: 10:40 PM 05/17/2024    Source of History   Patient    Chief Complaint   Pt presents with:   Chest Pain (Mid-sternal chest that started yesterday. Rates pain 3/10. Denies radiation of pain, denies nausea, denies shortness of breath./Pt states pain increases with a deep breath)      History Of Present Illness   Gayatri Henderson is a 43 y.o. female with history of bilateral pulmonary emboli started on previously on Eliquis, obesity who presents to the ED with chief complaint of mid-sternal chest pain with associated cough onset yesterday afternoon. She states that the pain is dull and worsens with deep breaths.  She states this pain started earlier today.  She notes that the pain is not as severe as her previous PE but feels similar. She reports that she ceased taking her Eliquis about 1 year ago based off her hematologist's recommendation.  She notes mild shortness of breath.  She denies fever, leg swelling, falls/trauma, abdominal pain, nausea, vomiting, diarrhea, difficulty urinating, frequency, and recent sick contacts. LMP on 5/6/24 and she has heavy menstrual periods. Patient did not take analgesics PTA.    This is the extent to the patients complaints today here in the emergency department.  Past History   Review of patient's allergies indicates:  No Known Allergies    No current facility-administered medications on file prior to encounter.     Current Outpatient Medications on File Prior to Encounter   Medication Sig Dispense Refill    colchicine (MITIGARE) 0.6 mg Cap Take 2 tablets at onset of flare, may repeat dose in 1 hour on day 1, day #2 may take 1 tablet every 12 hours until flare resolves 30 capsule 2    indomethacin (INDOCIN) 50 MG capsule Take 1 capsule (50 mg total) by mouth 3 (three) times daily with meals. 60 capsule 1    loratadine (CLARITIN) 10 mg tablet Take 1 tablet (10 mg total) by mouth once daily. 30 tablet 0       As per HPI and below:  Past Medical History:  "  Diagnosis Date    Allergy     Bilateral pulmonary embolism 1/11/2022    Class 2 severe obesity due to excess calories with serious comorbidity and body mass index (BMI) of 38.0 to 38.9 in adult 3/27/2022    COVID-19 3/27/2022    Mixed dyslipidemia 3/31/2022     Past Surgical History:   Procedure Laterality Date    WISDOM TOOTH EXTRACTION      ?late 20s       Social History     Socioeconomic History    Marital status: Single   Tobacco Use    Smoking status: Never    Smokeless tobacco: Never   Substance and Sexual Activity    Alcohol use: Not Currently     Comment: socially    Drug use: Never    Sexual activity: Not Currently       Family History   Problem Relation Name Age of Onset    Deep vein thrombosis Mother      No Known Problems Father      Heart failure Maternal Grandmother      Breast cancer Maternal Aunt      Cancer Maternal Uncle         Physical Exam     Vitals:    05/16/24 2240 05/17/24 0157   BP: (!) 145/78 (!) 102/56   BP Location:  Right arm   Patient Position:  Lying   Pulse: 86 72   Resp: 19 17   Temp: 97.8 °F (36.6 °C) 98 °F (36.7 °C)   TempSrc: Oral Oral   SpO2: 100% 100%   Weight: 98.9 kg (218 lb)    Height: 5' 2" (1.575 m)      Physical Exam:   Nursing note and vitals reviewed.  Appearance: Well-appearing, nontoxic female in no acute respiratory distress.  Making purposeful movements.  Speaking in full sentences.  Skin: No obvious rashes seen.  Good turgor.  No abrasions.  No ecchymoses.  Eyes: No conjunctival injection. EOMI, PERRL.  Head: NC/AT  Neck: No JVD.  Chest: CTAB. No increased work of breathing, bilateral chest rise. No wheezing.  Cardiovascular: Regular rate and rhythm.  Normal equal bilateral radial pulses. No lower extremity edema.  Abdomen: Soft.  Not distended.  Nontender.  No guarding.  No rebound. No Masses  Musculoskeletal: No obvious deformities. Neck supple, full range of motion, no obvious deformity.   No tenderness to palpation of cervical through lumbar spine.  No " step-offs or deformities. Good range of motion all joints.  Neurologic: Moves all extremities.  Normal sensation.  No facial droop.  Normal speech.    Mental Status:  Alert and oriented x 3.  Appropriate, conversant.      Results and Medications    Procedures    Labs Reviewed   CBC W/ AUTO DIFFERENTIAL - Abnormal; Notable for the following components:       Result Value    Hemoglobin 11.9 (*)     Hematocrit 35.8 (*)     MCV 79 (*)     MCH 26.2 (*)     RDW 15.6 (*)     All other components within normal limits   COMPREHENSIVE METABOLIC PANEL - Abnormal; Notable for the following components:    CO2 22 (*)     All other components within normal limits   D DIMER, QUANTITATIVE - Abnormal; Notable for the following components:    D-Dimer 0.86 (*)     All other components within normal limits   TROPONIN I   B-TYPE NATRIURETIC PEPTIDE   HIV 1 / 2 ANTIBODY    Narrative:     Release to patient->Immediate   HEPATITIS C ANTIBODY    Narrative:     Release to patient->Immediate   POCT URINE PREGNANCY   SARS-COV-2 RDRP GENE   POCT INFLUENZA A/B MOLECULAR       Imaging Results              CTA Chest Non-Coronary (PE Studies) (Final result)  Result time 05/17/24 01:19:33      Final result by Marco Wallace MD (05/17/24 01:19:33)                   Impression:      No acute pulmonary thromboemboli.        Electronically signed by: Marco Wallace MD  Date:    05/17/2024  Time:    01:19               Narrative:    EXAMINATION:  CTA CHEST NON CORONARY (PE STUDIES)    CLINICAL HISTORY:  Pulmonary embolism (PE) suspected, unknown D-dimer;    TECHNIQUE:  Low dose axial images, sagittal and coronal reformations were obtained from the thoracic inlet to the lung bases following the IV administration of 100 mL of Omnipaque 350.  Contrast timing was optimized to evaluate the pulmonary arteries.  MIP images were performed.    COMPARISON:  CTA chest 01/11/2022.    FINDINGS:    Good contrast bolus opacification of the pulmonary arteries. No  acute pulmonary thromboembolism. No hilar or mediastinal mass or lymphadenopathy. No pleural or pericardial effusion. Lungs are well-expanded and clear with no consolidation, pulmonary nodule or pneumothorax.  Limited images through the upper abdomen demonstrate a calcification in the left hepatic lobe, similar compared to prior.                                       X-Ray Chest AP Portable (Final result)  Result time 05/17/24 00:54:45      Final result by Marco Wallace MD (05/17/24 00:54:45)                   Impression:      No acute findings in the chest.      Electronically signed by: Marco Wallace MD  Date:    05/17/2024  Time:    00:54               Narrative:    EXAMINATION:  XR CHEST AP PORTABLE    CLINICAL HISTORY:  Chest Pain;    TECHNIQUE:  Single frontal view of the chest was performed.    COMPARISON:  02/14/2022.    FINDINGS:  No consolidation, pleural effusion or pneumothorax.    Cardiomediastinal silhouette is unremarkable.                                          Medications   iohexoL (OMNIPAQUE 350) injection 100 mL (100 mLs Intravenous Given 5/17/24 0053)       MDM, Impression and Plan   Previous Records:   I decided to obtain old medical records which is listed here or in ED course: Patient's hemoglobin on 3/27/23 was 12.1. Hem/Onc office visit on 4/25/22 states that the patient was recommended by her cardiologist to stay on anti-coagulation for long term.    Independently Interpreted Test(s):   EKG:  I independently reviewed and interpreted the EKG and my findings are as follows:   Detailed here or in ED course.   IMAGING:  I have ordered and independently interpreted X-rays and my findings are as follow:  Detailed here or in ED course. CXR shows no pneumothorax, pneumonia or pleural effusions.    Clinical Tests:   Lab Tests: Ordered and Reviewed  Radiological Study: Ordered and Reviewed  Medical Tests: Ordered and Reviewed    Differential diagnosis:  -ACS  -CAD  -Tension Pneumothorax    -thought was given to Aortic Dissection, Cardiac Tamponade, Esophageal Rupture  -Pulmonary Embolism   -pneumonia    Initial Assessment & ED Management:    Urgent evaluation of 43 y.o. female with history as above who presents to the ED with chief complaint of mid-sternal chest pain with associated cough onset yesterday afternoon. She presents afebrile and hemodynamically stable.  Patient was offered analgesics yet declined.  Her COVID and flu were negative.  Chest x-ray BNP and physical exam makes heart failure unlikely.  Hemoglobin near baseline with no leukocytosis.  CMP grossly unrevealing.  Patient did not desaturate on ambulatory pulse ox monitoring.  She was noted to have an elevated D-dimer and a history of pulmonary emboli awaiting CT PE.  I do not believe that patient needs a another troponin based off the chronicity of her complaints,.  I do not feel like her presentation is consistent with ACS based off her physical exam complaints history EKG and troponin.  Chest were grossly unremarkable.  CT PE shows no pulmonary emboli in no noted pathology.  Patient was not desaturate on ambulation.  Recommended she follow up with her cardiologist in the outpatient setting.  Her hematologist told her to stop taking anticoagulation.  Her cardiologist recommended that she continue this.  I recommended that she follow up with both of these doctors.  Recommended that she get an ultrasound of her lower extremities in the outpatient setting.  Care plan addressed with patient and all those present. All questions answered.  Strict return precautions discussed.  Patient was instructed on the correct follow-up time and route.  They voiced verbal understanding and agreement  with the plan and were deemed stable for discharge.   Medical Decision Making  Amount and/or Complexity of Data Reviewed  External Data Reviewed: radiology and notes.     Details: On chart review patient was noted to have bilateral pulmonary emboli on  01/11/2022 when she presented with left chest pain and cough started on Lovenox bilateral venous ultrasound negative for DVTs, discharged on Eliquis.    She was signed echo on 01/12/2022 which showed  · Normal systolic function.  · The estimated ejection fraction is 65%.  · Normal left ventricular diastolic function.  · Normal right ventricular size with normal right ventricular systolic function.  · Normal central venous pressure (3 mmHg).    Labs: ordered.  Radiology: ordered.    Risk  Prescription drug management.           Please see ED course for discussion of workup and dispo if not listed above.          Final diagnoses:  [R07.9] Chest pain (Primary)  [R06.02] Shortness of breath        ED Disposition Condition    Discharge Stable          ED Prescriptions    None       Follow-up Information       Follow up With Specialties Details Why Contact Info    Sarah Green MD Family Medicine In 2 days  [address] [facility] [address]  [phone]      Your cardiologist and your hematologist    Please follow up with your cardiologist and hematologist in the next 2-3 days.            ED Course as of 05/17/24 0637   Thu May 16, 2024   2244 EKG shows normal sinus rhythm with ventricular rate of 85 beats per minute.  Narrow QRS.  No ST segment elevations depressions no STEMI. [HM]   2359 Patient reassessed and states that her pain has not worsened. [HM]      ED Course User Index  [HM] Caro Paulino MD I, Sonal Salinas, scribed for, and in the presence of, Caro Paulino MD. I performed the scribed service and the documentation accurately describes the services I performed. I attest to the accuracy of the note.     Physician Attestation for Scribe: I, Caro Paulino MD , reviewed documentation as scribed in my presence, which is both accurate and complete.    MD Jefferson Arthur Heyward B, MD  05/17/24 0671

## 2024-05-17 NOTE — ED TRIAGE NOTES
Pt c/o CP that began yesterday while driving, accompanied by cough, pmh of gout, takes indomethacin intermittently for gout flares, recently recovered from a 2 week gout flare, also had PE in 2022, took eliquis for a year, has been off of it for a year.

## 2024-05-22 LAB
OHS QRS DURATION: 76 MS
OHS QTC CALCULATION: 447 MS

## 2024-06-13 ENCOUNTER — ON-DEMAND VIRTUAL (OUTPATIENT)
Dept: URGENT CARE | Facility: CLINIC | Age: 44
End: 2024-06-13
Payer: MEDICAID

## 2024-06-13 ENCOUNTER — PATIENT OUTREACH (OUTPATIENT)
Dept: ADMINISTRATIVE | Facility: OTHER | Age: 44
End: 2024-06-13
Payer: MEDICAID

## 2024-06-13 ENCOUNTER — PATIENT MESSAGE (OUTPATIENT)
Dept: PRIMARY CARE CLINIC | Facility: CLINIC | Age: 44
End: 2024-06-13
Payer: MEDICAID

## 2024-06-13 DIAGNOSIS — Z76.0 MEDICATION REFILL: Primary | ICD-10-CM

## 2024-06-13 DIAGNOSIS — M10.9 GOUT, ARTHROPATHY: ICD-10-CM

## 2024-06-13 PROCEDURE — 99213 OFFICE O/P EST LOW 20 MIN: CPT | Mod: 95,,, | Performed by: NURSE PRACTITIONER

## 2024-06-13 RX ORDER — COLCHICINE 0.6 MG/1
CAPSULE ORAL
Qty: 30 CAPSULE | Refills: 0 | Status: SHIPPED | OUTPATIENT
Start: 2024-06-13 | End: 2024-06-14 | Stop reason: SDUPTHER

## 2024-06-13 RX ORDER — COLCHICINE 0.6 MG/1
TABLET ORAL
Qty: 3 TABLET | Refills: 0 | Status: SHIPPED | OUTPATIENT
Start: 2024-06-13 | End: 2024-06-14 | Stop reason: SDUPTHER

## 2024-06-13 RX ORDER — INDOMETHACIN 50 MG/1
50 CAPSULE ORAL
Qty: 60 CAPSULE | Refills: 0 | Status: SHIPPED | OUTPATIENT
Start: 2024-06-13

## 2024-06-13 NOTE — PATIENT INSTRUCTIONS
Patient Education       Gout Discharge Instructions   About this topic   Gout causes pain and swelling in a joint, which is a form of arthritis. Gout can happen in some people who have too much uric acid in their blood. Everyone has some uric acid in their blood. It is produced when the body breaks down certain foods. In some cases, too much uric acid builds up.  Uric acid can form sharp crystals that can sometimes build up in different parts of your body, like your joints. Then you may have pain and swelling. This is called a gout flare or attack. Most of the time a painful joint from gout will get better, especially with treatment, within a few days to a few weeks.     Proper treatment may improve signs and help prevent more gout attacks. Treatments needed are drugs, diet, and lifestyle changes. Surgery may be needed if the gouty part (tophi) is affecting your movement.  What care is needed at home?   Ask your doctor what you need to do when you go home. Make sure you ask questions if you do not understand what the doctor says.  Take all your medicines as ordered for treating your gout flare. This may include medicines ordered by your doctor or medicines like ibuprofen or naproxen for swelling and pain. These are nonsteroidal anti-inflammatory drugs (NSAIDS). Some over-the-counter medicines and prescription medicines contain the same drug. Do not take multiple medicines without talking to your doctor first.  Do not take aspirin to treat your gout flare.  Rest your joint. Prop it on pillows, keeping it above the level of your heart. This may help lessen pain and swelling.  Ice may help with your pain. Place an ice pack or a bag of frozen vegetables wrapped in a towel over the painful part. Never put ice right on the skin. Do not leave the ice on more than 10 to 15 minutes at a time.  Eat a healthy diet that includes plenty of fruits, vegetables, whole grain, and low-fat dairy products.  Drink plenty of water. Limit  sugary drinks and alcohol as they can make your gout flare worse.  Talk with your regular doctor about other medicines or lifestyle changes that can help prevent gout flares. Keeping a healthy weight or losing weight in a healthy way may help.  What follow-up care is needed?   Your doctor may ask you to make visits to the office to check on your progress. Be sure to keep these visits.  You may need to have blood tests to measure your uric acid levels. The doctor may also order tests to check how well your kidneys are working.  What drugs may be needed?   The doctor may order drugs to:  Help with pain and swelling  Lower uric acid levels  Prevent gout attacks  Your doctor may also change some of the drugs you are taking. Certain drugs may increase your uric acid levels and this makes gout worse.  Will physical activity be limited?   Exercise regularly. This can help reduce pain and improve your body function. Your doctor can help you figure out the best exercise for you.  What changes to diet are needed?   Ask for a dietitian to review your food choices.  Avoid high-purine foods such as:  Red meats like steak and hamburgers  Organ meat like kidney, liver, or brains  Wild game like rabbit, venison, quail, pheasant, and goose  Burger, anchovies, sardines, or caviar  Seafood and shellfish like shrimp, lobsters, mackerel, sardines, mussels, tuna, trout, codfish, jesika, herring, or scallops  Beer, wine, and mixed drinks (alcohol)  Dried beans and peas  Some vegetables, such as asparagus, mushrooms, spinach, and cauliflower  Gravy  Avoid high-fructose foods such as:  Sweetened drinks and juices  Foods with added fructose corn syrup like sodas, enriched fruit drinks, cereals, ice creams, and candy  What problems could happen?   Kidney stones and other kidney problems  Long-lasting joint problem  Ongoing very bad pain  What can be done to prevent this health problem?   Tell your doctor if you are taking drugs for heart and  kidney problems. Tell your doctor about all the drugs, vitamins, herbs, supplements, and any over-the-counter drugs you are taking. If you need to stop taking these drugs, ask your doctor first.  When do I need to call the doctor?   You have a fever of 102.2°F (39°C) or higher, or chills with severe joint pain that does not get better with treatment.  You have a fever of 100.4°F (38°C) or higher or chills.  You have pain with passing urine.  Your symptoms are not improving within 2 to 3 days or your pain does not go away completely after a few weeks.  Teach Back: Helping You Understand   The Teach Back Method helps you understand the information we are giving you. After you talk with the staff, tell them in your own words what you learned. This helps to make sure the staff has described each thing clearly. It also helps to explain things that may have been confusing. Before going home, make sure you can do these:  I can tell you about my condition.  I can tell you what may help ease my pain.  I can tell you what changes I need to make with my diet or drugs.  I can tell you what I will do if I have warm, red, swollen joints or very bad pain.  Where can I learn more?   American Academy [employer]  https://familydoctor.org/condition/gout/   [employer] [place] [employer] Mus[employer]  http://www.niams.nih.gov/Health_Info/Gout/default.asp   NHS  https://www.nhs.uk/conditions/gout/   Last Reviewed Date   2021-06-16  Consumer Information Use and Disclaimer   This information is not specific medical advice and does not replace information you receive from your health care provider. This is only a brief summary of general information. It does NOT include all information about conditions, illnesses, injuries, tests, procedures, treatments, therapies, discharge instructions or life-style choices that may apply to you. You must talk with your health care provider for complete information  about your health and treatment options. This information should not be used to decide whether or not to accept your health care providers advice, instructions or recommendations. Only your health care provider has the knowledge and training to provide advice that is right for you.  Copyright   Copyright © 2021 [employer] and its affiliates and/or licensors. All rights reserved.

## 2024-06-13 NOTE — PROGRESS NOTES
Subjective:      Patient ID: Gayatri Henderson is a 43 y.o. female.    Vitals:  vitals were not taken for this visit.     Chief Complaint: Gout      Visit Type: TELE AUDIOVISUAL    Present with the patient at the time of consultation: TELEMED PRESENT WITH PATIENT: None, at home    Past Medical History:   Diagnosis Date    Allergy     Bilateral pulmonary embolism 1/11/2022    Class 2 severe obesity due to excess calories with serious comorbidity and body mass index (BMI) of 38.0 to 38.9 in adult 3/27/2022    COVID-19 3/27/2022    Mixed dyslipidemia 3/31/2022     Past Surgical History:   Procedure Laterality Date    WISDOM TOOTH EXTRACTION      ?late 20s     Review of patient's allergies indicates:  No Known Allergies  Current Outpatient Medications on File Prior to Visit   Medication Sig Dispense Refill    loratadine (CLARITIN) 10 mg tablet Take 1 tablet (10 mg total) by mouth once daily. 30 tablet 0    [DISCONTINUED] colchicine (MITIGARE) 0.6 mg Cap Take 2 tablets at onset of flare, may repeat dose in 1 hour on day 1, day #2 may take 1 tablet every 12 hours until flare resolves 30 capsule 2    [DISCONTINUED] indomethacin (INDOCIN) 50 MG capsule Take 1 capsule (50 mg total) by mouth 3 (three) times daily with meals. 60 capsule 1     No current facility-administered medications on file prior to visit.     Family History   Problem Relation Name Age of Onset    Deep vein thrombosis Mother      No Known Problems Father      Heart failure Maternal Grandmother      Breast cancer Maternal Aunt      Cancer Maternal Uncle         Medications Ordered                [id number] - [place], [place] - [address]   [address] [address]    Telephone: [phone]   Fax: [phone]   Hours: Not open 24 hours                         E-Prescribed (2 of 2)              colchicine (MITIGARE) 0.6 mg Cap    Sig: Take 2 tablets at onset of flare,  may repeat dose in 1 hour on day 1, day #2 may take 1 tablet every 12 hours until flare resolves       Start: 6/13/24     Quantity: 30 capsule Refills: 0                         indomethacin (INDOCIN) 50 MG capsule    Sig: Take 1 capsule (50 mg total) by mouth 3 (three) times daily with meals.       Start: 6/13/24     Quantity: 60 capsule Refills: 0                           Ohs Peq Odvv Intake    6/13/2024  9:19 AM CDT - Filed by Patient   What is your current physical address in the event of a medical emergency? [address]   Are you able to take your vital signs? No   Please attach any relevant images or files          Gout flare to right big toe. Mild discoloration, warmth and swelling. Decreased ROM to the toe. +diet changes. Out of meds. Requesting refill.        Musculoskeletal:  Positive for pain, joint pain, joint swelling, abnormal ROM of joint and gout. Negative for trauma.   Skin:  Positive for color change.        Objective:   The physical exam was conducted virtually.  Physical Exam   Constitutional: She is oriented to person, place, and time. She does not appear ill. No distress.   HENT:   Head: Normocephalic and atraumatic.   Nose: Nose normal.   Eyes: Extraocular movement intact   Pulmonary/Chest: Effort normal.   Abdominal: Normal appearance.   Musculoskeletal:      Right foot: Right great toe: Exhibits decreased ROM, swelling and tenderness.   Neurological: no focal deficit. She is alert and oriented to person, place, and time.   Psychiatric: Her behavior is normal. Mood normal.   Vitals reviewed.      Assessment:     1. Medication refill    2. Gout, arthropathy        Plan:     Patient encouraged to monitor symptoms closely and instructed to follow-up for new or worsening symptoms. Further, in-person, evaluation may be necessary for continued treatment. Please follow up with your primary care doctor or specialist as needed. Verbally discussed plan. Patient confirms understanding and is in  agreement with treatment and plan.     You must understand that you've received a Virtual Care evaluation only and that you may be released before all your medical problems are known or treated. You, the patient, will arrange for follow up care as instructed.    Medication refill  -     colchicine (MITIGARE) 0.6 mg Cap; Take 2 tablets at onset of flare, may repeat dose in 1 hour on day 1, day #2 may take 1 tablet every 12 hours until flare resolves  Dispense: 30 capsule; Refill: 0  -     indomethacin (INDOCIN) 50 MG capsule; Take 1 capsule (50 mg total) by mouth 3 (three) times daily with meals.  Dispense: 60 capsule; Refill: 0    Gout, arthropathy  -     colchicine (MITIGARE) 0.6 mg Cap; Take 2 tablets at onset of flare, may repeat dose in 1 hour on day 1, day #2 may take 1 tablet every 12 hours until flare resolves  Dispense: 30 capsule; Refill: 0  -     indomethacin (INDOCIN) 50 MG capsule; Take 1 capsule (50 mg total) by mouth 3 (three) times daily with meals.  Dispense: 60 capsule; Refill: 0             Patient Instructions   Patient Education       Gout Discharge Instructions   About this topic   Gout causes pain and swelling in a joint, which is a form of arthritis. Gout can happen in some people who have too much uric acid in their blood. Everyone has some uric acid in their blood. It is produced when the body breaks down certain foods. In some cases, too much uric acid builds up.  Uric acid can form sharp crystals that can sometimes build up in different parts of your body, like your joints. Then you may have pain and swelling. This is called a gout flare or attack. Most of the time a painful joint from gout will get better, especially with treatment, within a few days to a few weeks.     Proper treatment may improve signs and help prevent more gout attacks. Treatments needed are drugs, diet, and lifestyle changes. Surgery may be needed if the gouty part (tophi) is affecting your movement.  What care is  needed at home?   Ask your doctor what you need to do when you go home. Make sure you ask questions if you do not understand what the doctor says.  Take all your medicines as ordered for treating your gout flare. This may include medicines ordered by your doctor or medicines like ibuprofen or naproxen for swelling and pain. These are nonsteroidal anti-inflammatory drugs (NSAIDS). Some over-the-counter medicines and prescription medicines contain the same drug. Do not take multiple medicines without talking to your doctor first.  Do not take aspirin to treat your gout flare.  Rest your joint. Prop it on pillows, keeping it above the level of your heart. This may help lessen pain and swelling.  Ice may help with your pain. Place an ice pack or a bag of frozen vegetables wrapped in a towel over the painful part. Never put ice right on the skin. Do not leave the ice on more than 10 to 15 minutes at a time.  Eat a healthy diet that includes plenty of fruits, vegetables, whole grain, and low-fat dairy products.  Drink plenty of water. Limit sugary drinks and alcohol as they can make your gout flare worse.  Talk with your regular doctor about other medicines or lifestyle changes that can help prevent gout flares. Keeping a healthy weight or losing weight in a healthy way may help.  What follow-up care is needed?   Your doctor may ask you to make visits to the office to check on your progress. Be sure to keep these visits.  You may need to have blood tests to measure your uric acid levels. The doctor may also order tests to check how well your kidneys are working.  What drugs may be needed?   The doctor may order drugs to:  Help with pain and swelling  Lower uric acid levels  Prevent gout attacks  Your doctor may also change some of the drugs you are taking. Certain drugs may increase your uric acid levels and this makes gout worse.  Will physical activity be limited?   Exercise regularly. This can help reduce pain and  improve your body function. Your doctor can help you figure out the best exercise for you.  What changes to diet are needed?   Ask for a dietitian to review your food choices.  Avoid high-purine foods such as:  Red meats like steak and hamburgers  Organ meat like kidney, liver, or brains  Wild game like rabbit, venison, quail, pheasant, and goose  Burger, anchovies, sardines, or caviar  Seafood and shellfish like shrimp, lobsters, mackerel, sardines, mussels, tuna, trout, codfish, jesika, herring, or scallops  Beer, wine, and mixed drinks (alcohol)  Dried beans and peas  Some vegetables, such as asparagus, mushrooms, spinach, and cauliflower  Gravy  Avoid high-fructose foods such as:  Sweetened drinks and juices  Foods with added fructose corn syrup like sodas, enriched fruit drinks, cereals, ice creams, and candy  What problems could happen?   Kidney stones and other kidney problems  Long-lasting joint problem  Ongoing very bad pain  What can be done to prevent this health problem?   Tell your doctor if you are taking drugs for heart and kidney problems. Tell your doctor about all the drugs, vitamins, herbs, supplements, and any over-the-counter drugs you are taking. If you need to stop taking these drugs, ask your doctor first.  When do I need to call the doctor?   You have a fever of 102.2°F (39°C) or higher, or chills with severe joint pain that does not get better with treatment.  You have a fever of 100.4°F (38°C) or higher or chills.  You have pain with passing urine.  Your symptoms are not improving within 2 to 3 days or your pain does not go away completely after a few weeks.  Teach Back: Helping You Understand   The Teach Back Method helps you understand the information we are giving you. After you talk with the staff, tell them in your own words what you learned. This helps to make sure the staff has described each thing clearly. It also helps to explain things that may have been confusing. Before going  home, make sure you can do these:  I can tell you about my condition.  I can tell you what may help ease my pain.  I can tell you what changes I need to make with my diet or drugs.  I can tell you what I will do if I have warm, red, swollen joints or very bad pain.  Where can I learn more?   American Academy of Family Physicians  https://familydoctor.org/condition/gout/   [employer] [place] of Arthritis and Musculoskeletal and Skin Diseases  http://www.niams.nih.gov/Health_Info/Gout/default.asp   NHS  https://www.nhs.uk/conditions/gout/   Last Reviewed Date   2021-06-16  Consumer Information Use and Disclaimer   This information is not specific medical advice and does not replace information you receive from your health care provider. This is only a brief summary of general information. It does NOT include all information about conditions, illnesses, injuries, tests, procedures, treatments, therapies, discharge instructions or life-style choices that may apply to you. You must talk with your health care provider for complete information about your health and treatment options. This information should not be used to decide whether or not to accept your health care providers advice, instructions or recommendations. Only your health care provider has the knowledge and training to provide advice that is right for you.  Copyright   Copyright © 2021 UpToDate, Inc. and its affiliates and/or licensors. All rights reserved.

## 2024-06-13 NOTE — PROGRESS NOTES
CHW - Initial Contact    This Community Health Worker completed the Social Determinant of Health questionnaire with patient via telephone today.    Pt identified barriers of most importance are: Financial and food insecurity   Referrals to community agencies completed with patient/caregiver consent outside of [facility] include: No  Referrals were put through [facility] - no:   Support and Services: No  Other information discussed the patient needs / wants help with: SDOH completed. Patient reported having financial strain and food insecurity. Patient stated she recently lost her job and as of now works an a consultant per dim . Patient stated her income varies and isn't stable. Patient stated she is actively looking for full time employment. Patient stated she has applied for SNAP benefits and has a phone interview scheduled. Patient has been advised to contact CHW for any additional assistance needed. Patient case will be closed at this time.  Follow up required: No   No future outreach task assigned

## 2024-06-14 ENCOUNTER — OFFICE VISIT (OUTPATIENT)
Dept: PRIMARY CARE CLINIC | Facility: CLINIC | Age: 44
End: 2024-06-14
Payer: MEDICAID

## 2024-06-14 VITALS
DIASTOLIC BLOOD PRESSURE: 63 MMHG | SYSTOLIC BLOOD PRESSURE: 94 MMHG | HEIGHT: 62 IN | OXYGEN SATURATION: 97 % | BODY MASS INDEX: 39.07 KG/M2 | TEMPERATURE: 98 F | HEART RATE: 78 BPM | WEIGHT: 212.31 LBS

## 2024-06-14 DIAGNOSIS — Z12.31 ENCOUNTER FOR SCREENING MAMMOGRAM FOR MALIGNANT NEOPLASM OF BREAST: Primary | ICD-10-CM

## 2024-06-14 DIAGNOSIS — D50.9 MICROCYTIC ANEMIA: ICD-10-CM

## 2024-06-14 DIAGNOSIS — E78.41 ELEVATED LIPOPROTEIN(A): ICD-10-CM

## 2024-06-14 DIAGNOSIS — E78.2 MIXED DYSLIPIDEMIA: ICD-10-CM

## 2024-06-14 DIAGNOSIS — M10.071 ACUTE IDIOPATHIC GOUT INVOLVING TOE OF RIGHT FOOT: ICD-10-CM

## 2024-06-14 PROCEDURE — 3078F DIAST BP <80 MM HG: CPT | Mod: CPTII,,, | Performed by: STUDENT IN AN ORGANIZED HEALTH CARE EDUCATION/TRAINING PROGRAM

## 2024-06-14 PROCEDURE — 1159F MED LIST DOCD IN RCRD: CPT | Mod: CPTII,,, | Performed by: STUDENT IN AN ORGANIZED HEALTH CARE EDUCATION/TRAINING PROGRAM

## 2024-06-14 PROCEDURE — 99396 PREV VISIT EST AGE 40-64: CPT | Mod: S$PBB,,, | Performed by: STUDENT IN AN ORGANIZED HEALTH CARE EDUCATION/TRAINING PROGRAM

## 2024-06-14 PROCEDURE — 3008F BODY MASS INDEX DOCD: CPT | Mod: CPTII,,, | Performed by: STUDENT IN AN ORGANIZED HEALTH CARE EDUCATION/TRAINING PROGRAM

## 2024-06-14 PROCEDURE — 99214 OFFICE O/P EST MOD 30 MIN: CPT | Mod: S$PBB,25,, | Performed by: STUDENT IN AN ORGANIZED HEALTH CARE EDUCATION/TRAINING PROGRAM

## 2024-06-14 PROCEDURE — 3074F SYST BP LT 130 MM HG: CPT | Mod: CPTII,,, | Performed by: STUDENT IN AN ORGANIZED HEALTH CARE EDUCATION/TRAINING PROGRAM

## 2024-06-14 PROCEDURE — 99999 PR PBB SHADOW E&M-EST. PATIENT-LVL III: CPT | Mod: PBBFAC,,, | Performed by: STUDENT IN AN ORGANIZED HEALTH CARE EDUCATION/TRAINING PROGRAM

## 2024-06-14 PROCEDURE — 99213 OFFICE O/P EST LOW 20 MIN: CPT | Mod: PBBFAC,PN | Performed by: STUDENT IN AN ORGANIZED HEALTH CARE EDUCATION/TRAINING PROGRAM

## 2024-06-14 RX ORDER — ASCORBIC ACID 500 MG
1000 TABLET ORAL 2 TIMES DAILY
Qty: 120 TABLET | Refills: 11 | Status: SHIPPED | OUTPATIENT
Start: 2024-06-14

## 2024-06-14 RX ORDER — COLCHICINE 0.6 MG/1
CAPSULE ORAL
Qty: 45 CAPSULE | Refills: 1 | Status: SHIPPED | OUTPATIENT
Start: 2024-06-14

## 2024-06-14 RX ORDER — ATORVASTATIN CALCIUM 20 MG/1
20 TABLET, FILM COATED ORAL DAILY
Qty: 30 TABLET | Refills: 11 | Status: SHIPPED | OUTPATIENT
Start: 2024-06-14 | End: 2025-06-14

## 2024-06-14 RX ORDER — NAPROXEN SODIUM 220 MG/1
1 TABLET ORAL 2 TIMES DAILY
Qty: 60 CAPSULE | Refills: 11 | Status: SHIPPED | OUTPATIENT
Start: 2024-06-14 | End: 2025-06-14

## 2024-06-14 NOTE — PROGRESS NOTES
06/14/2024    Gayatri Henderson  [id number]    Chief Complaint   Patient presents with    Annual Exam     Gout flare up. Right foot       HPI  This patient is known to me presents for annual visit.  Acutely is dealing with a flare and needs refill of gout medication.  Pain was triggered by sweets.  Chronic conditions mixed lipidemia and elevated lipoprotein a, history of PE.    Since last visit presented to the emergency room 1 month ago for chest pain and was concerned that it was related to history of PE.  On evaluation was diagnosed with acid reflux.  Patient states that this makes sense because she did have Solis's that day.  Is not on anticoagulation since 2022 after she last saw the hematologist and was advised that she can taper off.    Mixed hyperlipidemia and elevated lipoprotein A colon last saw Cardiology in 2022 and it was advised to start cholesterol medication in addition to aspirin, and fish oil.  Patient has not been taking any supplements.    Negative 10 point ROS outside of HPI    Social History     Socioeconomic History    Marital status: Single   Tobacco Use    Smoking status: Never    Smokeless tobacco: Never   Substance and Sexual Activity    Alcohol use: Not Currently     Comment: socially    Drug use: Never    Sexual activity: Not Currently     Social Determinants of Health     Financial Resource Strain: High Risk (6/13/2024)    Overall Financial Resource Strain (CARDIA)     Difficulty of Paying Living Expenses: Very hard   Food Insecurity: Food Insecurity Present (6/13/2024)    Hunger Vital Sign     Worried About Running Out of Food in the Last Year: Often true     Ran Out of Food in the Last Year: Often true   Transportation Needs: No Transportation Needs (6/13/2024)    PRAPARE - Transportation     Lack of Transportation (Medical): No     Lack of Transportation (Non-Medical): No   Physical Activity: Insufficiently Active (6/13/2024)    Exercise Vital Sign     Days of Exercise per  Week: 2 days     Minutes of Exercise per Session: 30 min   Stress: No Stress Concern Present (6/13/2024)    [employer] [place] [employer] - Occupational Stress Questionnaire     Feeling of Stress : Only a little   Housing Stability: High Risk (6/13/2024)    Housing Stability Vital Sign     Unable to Pay for Housing in the Last Year: Yes     Homeless in the Last Year: Yes           Current Outpatient Medications:     loratadine (CLARITIN) 10 mg tablet, Take 1 tablet (10 mg total) by mouth once daily., Disp: 30 tablet, Rfl: 0    colchicine (MITIGARE) 0.6 mg Cap, Day 1: 1.2 mg at the first sign of flare, followed by 0.6 mg after 1 hour. Day 2:  0.6 mg once or twice daily until flare resolves, Disp: 45 capsule, Rfl: 1    indomethacin (INDOCIN) 50 MG capsule, Take 1 capsule (50 mg total) by mouth 3 (three) times daily with meals., Disp: 60 capsule, Rfl: 0      Physical Exam  Vitals:    06/14/24 1050   BP: 94/63   Pulse: 78   Temp: 98.2 °F (36.8 °C)       Physical Exam      Gen: well appearing, NAD  Resp: non labored breathing, no crackles, no wheezes, CTAB  CV: RRR no murmur, gallops, rubs, no LE edema  Abd: soft nontender BS present no organomegaly  MSK:  Right great toe with edema    1. Encounter for screening mammogram for malignant neoplasm of breast  - Mammo Digital Screening Bilat w/ Rafi; Future    2. Acute idiopathic gout involving toe of right foot  - colchicine (MITIGARE) 0.6 mg Cap; Day 1: 1.2 mg at the first sign of flare, followed by 0.6 mg after 1 hour. Day 2:  0.6 mg once or twice daily until flare resolves  Dispense: 45 capsule; Refill: 1    3. Elevated lipoprotein(a)  - omega 3-dha-epa-fish oil (FISH OIL) 1,200 (144-216) mg Cap; Take 1 capsule by mouth 2 (two) times a day.  Dispense: 60 capsule; Refill: 11  - ascorbic acid, vitamin C, (VITAMIN C) 500 MG tablet; Take 2 tablets (1,000 mg total) by mouth 2 (two) times daily.  Dispense: 120 tablet; Refill: 11    4. Mixed dyslipidemia  - omega  3-dha-epa-fish oil (FISH OIL) 1,200 (144-216) mg Cap; Take 1 capsule by mouth 2 (two) times a day.  Dispense: 60 capsule; Refill: 11  - ascorbic acid, vitamin C, (VITAMIN C) 500 MG tablet; Take 2 tablets (1,000 mg total) by mouth 2 (two) times daily.  Dispense: 120 tablet; Refill: 11  - Lipid Panel; Future  - atorvastatin (LIPITOR) 20 MG tablet; Take 1 tablet (20 mg total) by mouth once daily.  Dispense: 30 tablet; Refill: 11    5. Microcytic anemia  - Iron and TIBC; Future  - FERRITIN; Future      RTC in one year sooner as needed     Sarah Green MD  Family Medicine

## 2024-06-14 NOTE — PATIENT INSTRUCTIONS
Aspirin 81 mg 3 times per week   Fish oil 500 mg/dl and Vit C 1000 mg at least once preferably twice a day

## 2024-07-16 ENCOUNTER — OFFICE VISIT (OUTPATIENT)
Dept: OBSTETRICS AND GYNECOLOGY | Facility: CLINIC | Age: 44
End: 2024-07-16
Payer: MEDICAID

## 2024-07-16 VITALS
DIASTOLIC BLOOD PRESSURE: 82 MMHG | BODY MASS INDEX: 40.16 KG/M2 | SYSTOLIC BLOOD PRESSURE: 130 MMHG | WEIGHT: 218.25 LBS | HEIGHT: 62 IN

## 2024-07-16 DIAGNOSIS — D25.9 UTERINE LEIOMYOMA, UNSPECIFIED LOCATION: Primary | ICD-10-CM

## 2024-07-16 DIAGNOSIS — Z01.419 WELL WOMAN EXAM WITH ROUTINE GYNECOLOGICAL EXAM: ICD-10-CM

## 2024-07-16 DIAGNOSIS — E78.2 MIXED DYSLIPIDEMIA: ICD-10-CM

## 2024-07-16 DIAGNOSIS — N92.0 MENORRHAGIA WITH REGULAR CYCLE: ICD-10-CM

## 2024-07-16 PROCEDURE — 1159F MED LIST DOCD IN RCRD: CPT | Mod: CPTII,,, | Performed by: OBSTETRICS & GYNECOLOGY

## 2024-07-16 PROCEDURE — 88175 CYTOPATH C/V AUTO FLUID REDO: CPT | Performed by: OBSTETRICS & GYNECOLOGY

## 2024-07-16 PROCEDURE — 3075F SYST BP GE 130 - 139MM HG: CPT | Mod: CPTII,,, | Performed by: OBSTETRICS & GYNECOLOGY

## 2024-07-16 PROCEDURE — 99999 PR PBB SHADOW E&M-EST. PATIENT-LVL III: CPT | Mod: PBBFAC,,, | Performed by: OBSTETRICS & GYNECOLOGY

## 2024-07-16 PROCEDURE — 99213 OFFICE O/P EST LOW 20 MIN: CPT | Mod: PBBFAC | Performed by: OBSTETRICS & GYNECOLOGY

## 2024-07-16 PROCEDURE — 1160F RVW MEDS BY RX/DR IN RCRD: CPT | Mod: CPTII,,, | Performed by: OBSTETRICS & GYNECOLOGY

## 2024-07-16 PROCEDURE — 3008F BODY MASS INDEX DOCD: CPT | Mod: CPTII,,, | Performed by: OBSTETRICS & GYNECOLOGY

## 2024-07-16 PROCEDURE — 99396 PREV VISIT EST AGE 40-64: CPT | Mod: S$PBB,,, | Performed by: OBSTETRICS & GYNECOLOGY

## 2024-07-16 PROCEDURE — 87624 HPV HI-RISK TYP POOLED RSLT: CPT | Performed by: OBSTETRICS & GYNECOLOGY

## 2024-07-16 PROCEDURE — 3079F DIAST BP 80-89 MM HG: CPT | Mod: CPTII,,, | Performed by: OBSTETRICS & GYNECOLOGY

## 2024-07-19 ENCOUNTER — HOSPITAL ENCOUNTER (OUTPATIENT)
Dept: RADIOLOGY | Facility: OTHER | Age: 44
Discharge: HOME | End: 2024-07-19
Attending: OBSTETRICS & GYNECOLOGY
Payer: MEDICAID

## 2024-07-19 DIAGNOSIS — N92.0 MENORRHAGIA WITH REGULAR CYCLE: ICD-10-CM

## 2024-07-19 DIAGNOSIS — D25.9 UTERINE LEIOMYOMA, UNSPECIFIED LOCATION: ICD-10-CM

## 2024-07-19 PROCEDURE — 76856 US EXAM PELVIC COMPLETE: CPT | Mod: TC

## 2024-07-19 PROCEDURE — 76830 TRANSVAGINAL US NON-OB: CPT | Mod: 26,,, | Performed by: RADIOLOGY

## 2024-07-19 PROCEDURE — 76830 TRANSVAGINAL US NON-OB: CPT | Mod: TC

## 2024-07-19 PROCEDURE — 76856 US EXAM PELVIC COMPLETE: CPT | Mod: 26,,, | Performed by: RADIOLOGY

## 2024-07-22 NOTE — PROGRESS NOTES
HISTORY OF PRESENT ILLNESS:    Gayatri Henderson is a 43 y.o. female, , Patient's last menstrual period was 2024 (exact date).,  presents for a routine exam and has no complaints.  Patient reports cycles occur every 4 weeks lasting 5-7 days using 2-3 overnight pads per day with clotting.     Past Medical History:   Diagnosis Date    Allergy     Bilateral pulmonary embolism 2022    Class 2 severe obesity due to excess calories with serious comorbidity and body mass index (BMI) of 38.0 to 38.9 in adult 3/27/2022    COVID-19 3/27/2022    Mixed dyslipidemia 3/31/2022       Past Surgical History:   Procedure Laterality Date    WISDOM TOOTH EXTRACTION      ?late 20s       MEDICATIONS AND ALLERGIES:      Current Outpatient Medications:     ascorbic acid, vitamin C, (VITAMIN C) 500 MG tablet, Take 2 tablets (1,000 mg total) by mouth 2 (two) times daily., Disp: 120 tablet, Rfl: 11    atorvastatin (LIPITOR) 20 MG tablet, Take 1 tablet (20 mg total) by mouth once daily., Disp: 30 tablet, Rfl: 11    colchicine (MITIGARE) 0.6 mg Cap, Day 1: 1.2 mg at the first sign of flare, followed by 0.6 mg after 1 hour. Day 2:  0.6 mg once or twice daily until flare resolves, Disp: 45 capsule, Rfl: 1    indomethacin (INDOCIN) 50 MG capsule, Take 1 capsule (50 mg total) by mouth 3 (three) times daily with meals., Disp: 60 capsule, Rfl: 0    omega 3-dha-epa-fish oil (FISH OIL) 1,200 (144-216) mg Cap, Take 1 capsule by mouth 2 (two) times a day., Disp: 60 capsule, Rfl: 11    loratadine (CLARITIN) 10 mg tablet, Take 1 tablet (10 mg total) by mouth once daily., Disp: 30 tablet, Rfl: 0    Review of patient's allergies indicates:  No Known Allergies    Family History   Problem Relation Name Age of Onset    Deep vein thrombosis Mother      No Known Problems Father      Heart failure Maternal Grandmother      Breast cancer Maternal Aunt      Cancer Maternal Uncle         Social History     Socioeconomic History    Marital  status: Single   Tobacco Use    Smoking status: Never     Passive exposure: Never    Smokeless tobacco: Never   Substance and Sexual Activity    Alcohol use: Not Currently     Comment: socially    Drug use: Never    Sexual activity: Not Currently     Social Determinants of Health     Financial Resource Strain: High Risk (6/13/2024)    Overall Financial Resource Strain (CARDIA)     Difficulty of Paying Living Expenses: Very hard   Food Insecurity: Food Insecurity Present (6/13/2024)    Hunger Vital Sign     Worried About Running Out of Food in the Last Year: Often true     Ran Out of Food in the Last Year: Often true   Transportation Needs: No Transportation Needs (6/13/2024)    PRAPARE - Transportation     Lack of Transportation (Medical): No     Lack of Transportation (Non-Medical): No   Physical Activity: Insufficiently Active (6/13/2024)    Exercise Vital Sign     Days of Exercise per Week: 2 days     Minutes of Exercise per Session: 30 min   Stress: No Stress Concern Present (6/13/2024)    [place] [place] [employer] - Occupational Stress Questionnaire     Feeling of Stress : Only a little   Housing Stability: High Risk (6/13/2024)    Housing Stability Vital Sign     Unable to Pay for Housing in the Last Year: Yes     Homeless in the Last Year: Yes       COMPREHENSIVE GYN HISTORY:  PAP History: Denies abnormal Paps.  Infection History: Denies STDs. Denies PID.  Benign History: Denies uterine fibroids. Denies ovarian cysts. Denies endometriosis. Denies other conditions.  Cancer History: Denies cervical cancer. Denies uterine cancer or hyperplasia. Denies ovarian cancer. Denies vulvar cancer or pre-cancer. Denies vaginal cancer or pre-cancer. Denies breast cancer. Denies colon cancer.  Sexual Activity History: Reports currently being sexually active  Menstrual History: Monthly. Mod then light flow.   Dysmenorrhea History: Reports mild dysmenorrhea.       ROS:  GENERAL: No weight changes. No swelling.  "No fatigue. No fever.  CARDIOVASCULAR: No chest pain. No shortness of breath. No leg cramps.   NEUROLOGICAL: No headaches. No vision changes.  BREASTS: No pain. No lumps. No discharge.  ABDOMEN: No pain. No nausea. No vomiting. No diarrhea. No constipation.  REPRODUCTIVE: No abnormal bleeding.   VULVA: No pain. No lesions. No itching.  VAGINA: No relaxation. No itching. No odor. No discharge. No lesions.  URINARY: No incontinence. No nocturia. No frequency. No dysuria.    /82   Ht 5' 2" (1.575 m)   Wt 99 kg (218 lb 4.1 oz)   LMP 07/01/2024 (Exact Date)   BMI 39.92 kg/m²     PE:  APPEARANCE: Well nourished, well developed, in no acute distress.  AFFECT: WNL, alert and oriented x 3.  SKIN: No acne or hirsutism.  NECK: Neck symmetric, without masses or thyromegaly.  NODES: No inguinal, cervical, axillary or femoral lymph node enlargement.  CHEST: Good respiratory effort.   ABDOMEN: Soft. No tenderness or masses. No hepatosplenomegaly. No hernias.  BREASTS: Symmetrical, no skin changes, visible lesions, palpable masses or nipple discharge bilaterally.  PELVIC: External female genitalia without lesions.  Female hair distribution. Adequate perineal body, Normal urethral meatus. Vagina moist and well rugated without lesions or discharge.  No significant cystocele or rectocele present. Cervix pink without lesions, discharge or tenderness. Uterus is 4-6 week size, regular, mobile and nontender. Adnexa without masses or tenderness.  EXTREMITIES: No edema    DIAGNOSIS:  1. Uterine leiomyoma, unspecified location    2. Menorrhagia with regular cycle    3. Well woman exam with routine gynecological exam    4. Mixed dyslipidemia        PLAN:    Orders Placed This Encounter    HPV High Risk Genotypes, PCR    US Pelvis Comp with Transvag NON-OB (xpd    Liquid-Based Pap Smear, Screening       COUNSELING:  The patient was counseled today on:  -A.C.S. Pap and pelvic exam guidelines (pap every 3 years), recomendations for " yearly mammogram;  -to follow up with her PCP for other health maintenance.    FOLLOW-UP with me annually.

## 2024-07-25 ENCOUNTER — HOSPITAL ENCOUNTER (OUTPATIENT)
Dept: RADIOLOGY | Facility: OTHER | Age: 44
Discharge: HOME | End: 2024-07-25
Attending: STUDENT IN AN ORGANIZED HEALTH CARE EDUCATION/TRAINING PROGRAM
Payer: MEDICAID

## 2024-07-25 DIAGNOSIS — Z12.31 ENCOUNTER FOR SCREENING MAMMOGRAM FOR MALIGNANT NEOPLASM OF BREAST: ICD-10-CM

## 2024-07-25 LAB
FINAL PATHOLOGIC DIAGNOSIS: NORMAL
Lab: NORMAL

## 2024-07-25 PROCEDURE — 77067 SCR MAMMO BI INCL CAD: CPT | Mod: TC

## 2024-07-25 PROCEDURE — 77063 BREAST TOMOSYNTHESIS BI: CPT | Mod: TC

## 2024-10-02 ENCOUNTER — PATIENT MESSAGE (OUTPATIENT)
Dept: OBSTETRICS AND GYNECOLOGY | Facility: CLINIC | Age: 44
End: 2024-10-02
Payer: COMMERCIAL

## 2025-05-17 ENCOUNTER — NURSE TRIAGE (OUTPATIENT)
Dept: ADMINISTRATIVE | Facility: CLINIC | Age: 45
End: 2025-05-17
Payer: COMMERCIAL

## 2025-05-17 ENCOUNTER — RESULTS FOLLOW-UP (OUTPATIENT)
Dept: URGENT CARE | Facility: CLINIC | Age: 45
End: 2025-05-17

## 2025-05-17 ENCOUNTER — OFFICE VISIT (OUTPATIENT)
Dept: URGENT CARE | Facility: CLINIC | Age: 45
End: 2025-05-17
Payer: COMMERCIAL

## 2025-05-17 VITALS
HEIGHT: 62 IN | BODY MASS INDEX: 40.12 KG/M2 | OXYGEN SATURATION: 96 % | DIASTOLIC BLOOD PRESSURE: 80 MMHG | WEIGHT: 218 LBS | TEMPERATURE: 98 F | RESPIRATION RATE: 17 BRPM | SYSTOLIC BLOOD PRESSURE: 124 MMHG | HEART RATE: 87 BPM
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
## 2025-05-17 DIAGNOSIS — M25.511 ACUTE PAIN OF RIGHT SHOULDER: ICD-10-CM

## 2025-05-17 DIAGNOSIS — S29.9XXA TRAUMA OF CHEST, INITIAL ENCOUNTER: ICD-10-CM

## 2025-05-17 DIAGNOSIS — M54.2 CERVICAL PAIN: ICD-10-CM

## 2025-05-17 DIAGNOSIS — M54.6 MIDLINE THORACIC BACK PAIN, UNSPECIFIED CHRONICITY: ICD-10-CM

## 2025-05-17 LAB
B-HCG UR QL: NEGATIVE
CTP QC/QA: YES

## 2025-05-17 PROCEDURE — 99214 OFFICE O/P EST MOD 30 MIN: CPT | Mod: S$GLB,,, | Performed by: PHYSICIAN ASSISTANT

## 2025-05-17 PROCEDURE — 81025 URINE PREGNANCY TEST: CPT | Mod: S$GLB,,, | Performed by: PHYSICIAN ASSISTANT

## 2025-05-17 PROCEDURE — 73030 X-RAY EXAM OF SHOULDER: CPT | Mod: RT,S$GLB,, | Performed by: RADIOLOGY

## 2025-05-17 PROCEDURE — 72040 X-RAY EXAM NECK SPINE 2-3 VW: CPT | Mod: S$GLB,,, | Performed by: RADIOLOGY

## 2025-05-17 PROCEDURE — 72070 X-RAY EXAM THORAC SPINE 2VWS: CPT | Mod: S$GLB,,, | Performed by: RADIOLOGY

## 2025-05-17 PROCEDURE — 71046 X-RAY EXAM CHEST 2 VIEWS: CPT | Mod: S$GLB,,, | Performed by: RADIOLOGY

## 2025-05-17 RX ORDER — MELOXICAM 15 MG/1
15 TABLET ORAL DAILY
Qty: 7 TABLET | Refills: 0 | Status: SHIPPED | OUTPATIENT
Start: 2025-05-18 | End: 2025-05-25
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
## 2025-05-22 ENCOUNTER — TELEPHONE (OUTPATIENT)
Dept: PRIMARY CARE CLINIC | Facility: CLINIC | Age: 45
End: 2025-05-22

## 2025-05-22 ENCOUNTER — OFFICE VISIT (OUTPATIENT)
Dept: PRIMARY CARE CLINIC | Facility: CLINIC | Age: 45
End: 2025-05-22
Payer: COMMERCIAL

## 2025-05-22 DIAGNOSIS — M50.30 DDD (DEGENERATIVE DISC DISEASE), CERVICAL: Primary | ICD-10-CM
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
## 2025-05-24 ENCOUNTER — OFFICE VISIT (OUTPATIENT)
Dept: URGENT CARE | Facility: CLINIC | Age: 45
End: 2025-05-24
Payer: COMMERCIAL

## 2025-05-24 VITALS
TEMPERATURE: 98 F | DIASTOLIC BLOOD PRESSURE: 63 MMHG | RESPIRATION RATE: 20 BRPM | HEIGHT: 62 IN | BODY MASS INDEX: 36.8 KG/M2 | WEIGHT: 200 LBS | HEART RATE: 84 BPM | SYSTOLIC BLOOD PRESSURE: 103 MMHG | OXYGEN SATURATION: 96 %

## 2025-05-24 DIAGNOSIS — M25.552 LEFT HIP PAIN: Primary | ICD-10-CM

## 2025-05-24 DIAGNOSIS — Z32.02 PREGNANCY TEST NEGATIVE: ICD-10-CM

## 2025-05-24 DIAGNOSIS — M54.32 LEFT SIDED SCIATICA: ICD-10-CM

## 2025-05-24 LAB
B-HCG UR QL: NEGATIVE
CTP QC/QA: YES

## 2025-05-24 PROCEDURE — 99213 OFFICE O/P EST LOW 20 MIN: CPT | Mod: 25,S$GLB,, | Performed by: FAMILY MEDICINE

## 2025-05-24 PROCEDURE — 96372 THER/PROPH/DIAG INJ SC/IM: CPT | Mod: S$GLB,,,

## 2025-05-24 PROCEDURE — 73502 X-RAY EXAM HIP UNI 2-3 VIEWS: CPT | Mod: LT,S$GLB,, | Performed by: RADIOLOGY

## 2025-05-24 PROCEDURE — 72100 X-RAY EXAM L-S SPINE 2/3 VWS: CPT | Mod: S$GLB,,, | Performed by: RADIOLOGY

## 2025-05-24 PROCEDURE — 81025 URINE PREGNANCY TEST: CPT | Mod: S$GLB,,, | Performed by: FAMILY MEDICINE

## 2025-05-24 RX ORDER — KETOROLAC TROMETHAMINE 30 MG/ML
30 INJECTION, SOLUTION INTRAMUSCULAR; INTRAVENOUS
Status: COMPLETED | OUTPATIENT
Start: 2025-05-24 | End: 2025-05-24

## 2025-05-24 RX ADMIN — KETOROLAC TROMETHAMINE 30 MG: 30 INJECTION, SOLUTION INTRAMUSCULAR; INTRAVENOUS at 02:05
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
## 2025-05-26 ENCOUNTER — TELEPHONE (OUTPATIENT)
Dept: ORTHOPEDICS | Facility: CLINIC | Age: 45
End: 2025-05-26
Payer: COMMERCIAL

## 2025-05-28 ENCOUNTER — HOSPITAL ENCOUNTER (OUTPATIENT)
Dept: RADIOLOGY | Facility: HOSPITAL | Age: 45
Discharge: HOME | End: 2025-05-28
Attending: STUDENT IN AN ORGANIZED HEALTH CARE EDUCATION/TRAINING PROGRAM
Payer: COMMERCIAL

## 2025-05-28 DIAGNOSIS — M50.30 DDD (DEGENERATIVE DISC DISEASE), CERVICAL: ICD-10-CM

## 2025-05-28 PROCEDURE — 72125 CT NECK SPINE W/O DYE: CPT | Mod: 26,,, | Performed by: RADIOLOGY

## 2025-05-28 PROCEDURE — 72125 CT NECK SPINE W/O DYE: CPT | Mod: TC

## 2025-05-29 ENCOUNTER — RESULTS FOLLOW-UP (OUTPATIENT)
Dept: PRIMARY CARE CLINIC | Facility: CLINIC | Age: 45
End: 2025-05-29
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
## 2025-05-30 ENCOUNTER — OFFICE VISIT (OUTPATIENT)
Dept: ORTHOPEDICS | Facility: CLINIC | Age: 45
End: 2025-05-30
Payer: COMMERCIAL

## 2025-05-30 VITALS — BODY MASS INDEX: 40.08 KG/M2 | HEIGHT: 62 IN | WEIGHT: 217.81 LBS

## 2025-05-30 DIAGNOSIS — M54.50 ACUTE LEFT-SIDED LOW BACK PAIN WITHOUT SCIATICA: Primary | ICD-10-CM

## 2025-05-30 PROCEDURE — 99999 PR PBB SHADOW E&M-EST. PATIENT-LVL III: CPT | Mod: PBBFAC,,, | Performed by: REGISTERED NURSE

## 2025-06-27 ENCOUNTER — OFFICE VISIT (OUTPATIENT)
Dept: PRIMARY CARE CLINIC | Facility: CLINIC | Age: 45
End: 2025-06-27
Payer: COMMERCIAL

## 2025-06-27 ENCOUNTER — LAB VISIT (OUTPATIENT)
Dept: PRIMARY CARE CLINIC | Facility: CLINIC | Age: 45
End: 2025-06-27
Payer: COMMERCIAL

## 2025-06-27 VITALS
TEMPERATURE: 98 F | DIASTOLIC BLOOD PRESSURE: 77 MMHG | BODY MASS INDEX: 41.3 KG/M2 | OXYGEN SATURATION: 99 % | SYSTOLIC BLOOD PRESSURE: 115 MMHG | HEART RATE: 82 BPM | HEIGHT: 62 IN | WEIGHT: 224.44 LBS

## 2025-06-27 DIAGNOSIS — Z12.31 ENCOUNTER FOR SCREENING MAMMOGRAM FOR MALIGNANT NEOPLASM OF BREAST: ICD-10-CM

## 2025-06-27 DIAGNOSIS — E66.01 CLASS 2 SEVERE OBESITY DUE TO EXCESS CALORIES WITH SERIOUS COMORBIDITY AND BODY MASS INDEX (BMI) OF 38.0 TO 38.9 IN ADULT: ICD-10-CM

## 2025-06-27 DIAGNOSIS — Z00.00 ROUTINE HEALTH MAINTENANCE: ICD-10-CM

## 2025-06-27 DIAGNOSIS — Z13.1 DIABETES MELLITUS SCREENING: ICD-10-CM

## 2025-06-27 DIAGNOSIS — E78.2 MIXED DYSLIPIDEMIA: ICD-10-CM

## 2025-06-27 DIAGNOSIS — E66.812 CLASS 2 SEVERE OBESITY DUE TO EXCESS CALORIES WITH SERIOUS COMORBIDITY AND BODY MASS INDEX (BMI) OF 38.0 TO 38.9 IN ADULT: ICD-10-CM

## 2025-06-27 DIAGNOSIS — E78.2 MIXED DYSLIPIDEMIA: Primary | ICD-10-CM

## 2025-06-27 LAB
ABSOLUTE EOSINOPHIL (OHS): 0.2 K/UL
ABSOLUTE MONOCYTE (OHS): 0.71 K/UL (ref 0.3–1)
ABSOLUTE NEUTROPHIL COUNT (OHS): 3.54 K/UL (ref 1.8–7.7)
ALBUMIN SERPL BCP-MCNC: 3.7 G/DL (ref 3.5–5.2)
ALP SERPL-CCNC: 84 UNIT/L (ref 40–150)
ALT SERPL W/O P-5'-P-CCNC: 26 UNIT/L (ref 10–44)
ANION GAP (OHS): 8 MMOL/L (ref 8–16)
AST SERPL-CCNC: 27 UNIT/L (ref 11–45)
BASOPHILS # BLD AUTO: 0.03 K/UL
BASOPHILS NFR BLD AUTO: 0.4 %
BILIRUB SERPL-MCNC: 0.3 MG/DL (ref 0.1–1)
BUN SERPL-MCNC: 10 MG/DL (ref 6–20)
CALCIUM SERPL-MCNC: 9 MG/DL (ref 8.7–10.5)
CHLORIDE SERPL-SCNC: 105 MMOL/L (ref 95–110)
CHOLEST SERPL-MCNC: 153 MG/DL (ref 120–199)
CHOLEST/HDLC SERPL: 4.3 {RATIO} (ref 2–5)
CO2 SERPL-SCNC: 23 MMOL/L (ref 23–29)
CREAT SERPL-MCNC: 0.7 MG/DL (ref 0.5–1.4)
EAG (OHS): 111 MG/DL (ref 68–131)
ERYTHROCYTE [DISTWIDTH] IN BLOOD BY AUTOMATED COUNT: 16.6 % (ref 11.5–14.5)
GFR SERPLBLD CREATININE-BSD FMLA CKD-EPI: >60 ML/MIN/1.73/M2
GLUCOSE SERPL-MCNC: 76 MG/DL (ref 70–110)
HBA1C MFR BLD: 5.5 % (ref 4–5.6)
HCT VFR BLD AUTO: 36.8 % (ref 37–48.5)
HDLC SERPL-MCNC: 36 MG/DL (ref 40–75)
HDLC SERPL: 23.5 % (ref 20–50)
HGB BLD-MCNC: 11.5 GM/DL (ref 12–16)
IMM GRANULOCYTES # BLD AUTO: 0.01 K/UL (ref 0–0.04)
IMM GRANULOCYTES NFR BLD AUTO: 0.1 % (ref 0–0.5)
LDLC SERPL CALC-MCNC: 96.2 MG/DL (ref 63–159)
LYMPHOCYTES # BLD AUTO: 2.26 K/UL (ref 1–4.8)
MCH RBC QN AUTO: 24.7 PG (ref 27–31)
MCHC RBC AUTO-ENTMCNC: 31.3 G/DL (ref 32–36)
MCV RBC AUTO: 79 FL (ref 82–98)
NONHDLC SERPL-MCNC: 117 MG/DL
NUCLEATED RBC (/100WBC) (OHS): 0 /100 WBC
PLATELET # BLD AUTO: 393 K/UL (ref 150–450)
PMV BLD AUTO: 12.1 FL (ref 9.2–12.9)
POTASSIUM SERPL-SCNC: 4.1 MMOL/L (ref 3.5–5.1)
PROT SERPL-MCNC: 7.7 GM/DL (ref 6–8.4)
RBC # BLD AUTO: 4.66 M/UL (ref 4–5.4)
RELATIVE EOSINOPHIL (OHS): 3 %
RELATIVE LYMPHOCYTE (OHS): 33.5 % (ref 18–48)
RELATIVE MONOCYTE (OHS): 10.5 % (ref 4–15)
RELATIVE NEUTROPHIL (OHS): 52.5 % (ref 38–73)
SODIUM SERPL-SCNC: 136 MMOL/L (ref 136–145)
TRIGL SERPL-MCNC: 104 MG/DL (ref 30–150)
TSH SERPL-ACNC: 1.57 UIU/ML (ref 0.4–4)
WBC # BLD AUTO: 6.75 K/UL (ref 3.9–12.7)

## 2025-06-27 PROCEDURE — 99999 PR PBB SHADOW E&M-EST. PATIENT-LVL III: CPT | Mod: PBBFAC,,, | Performed by: STUDENT IN AN ORGANIZED HEALTH CARE EDUCATION/TRAINING PROGRAM

## 2025-06-27 PROCEDURE — 99396 PREV VISIT EST AGE 40-64: CPT | Mod: S$GLB,,, | Performed by: STUDENT IN AN ORGANIZED HEALTH CARE EDUCATION/TRAINING PROGRAM

## 2025-06-27 PROCEDURE — 3044F HG A1C LEVEL LT 7.0%: CPT | Mod: CPTII,S$GLB,, | Performed by: STUDENT IN AN ORGANIZED HEALTH CARE EDUCATION/TRAINING PROGRAM

## 2025-06-27 PROCEDURE — 1159F MED LIST DOCD IN RCRD: CPT | Mod: CPTII,S$GLB,, | Performed by: STUDENT IN AN ORGANIZED HEALTH CARE EDUCATION/TRAINING PROGRAM

## 2025-06-27 PROCEDURE — 80061 LIPID PANEL: CPT

## 2025-06-27 PROCEDURE — 84443 ASSAY THYROID STIM HORMONE: CPT

## 2025-06-27 PROCEDURE — 3008F BODY MASS INDEX DOCD: CPT | Mod: CPTII,S$GLB,, | Performed by: STUDENT IN AN ORGANIZED HEALTH CARE EDUCATION/TRAINING PROGRAM

## 2025-06-27 PROCEDURE — 3074F SYST BP LT 130 MM HG: CPT | Mod: CPTII,S$GLB,, | Performed by: STUDENT IN AN ORGANIZED HEALTH CARE EDUCATION/TRAINING PROGRAM

## 2025-06-27 PROCEDURE — 85025 COMPLETE CBC W/AUTO DIFF WBC: CPT

## 2025-06-27 PROCEDURE — 83036 HEMOGLOBIN GLYCOSYLATED A1C: CPT

## 2025-06-27 PROCEDURE — 3078F DIAST BP <80 MM HG: CPT | Mod: CPTII,S$GLB,, | Performed by: STUDENT IN AN ORGANIZED HEALTH CARE EDUCATION/TRAINING PROGRAM

## 2025-06-27 PROCEDURE — 80053 COMPREHEN METABOLIC PANEL: CPT

## 2025-06-30 ENCOUNTER — RESULTS FOLLOW-UP (OUTPATIENT)
Dept: PRIMARY CARE CLINIC | Facility: CLINIC | Age: 45
End: 2025-06-30

## 2025-07-06 NOTE — PROGRESS NOTES
07/06/2025    Gayatri Henderson  [id number]    Chief Complaint   Patient presents with    Annual Exam     No concerns/complaints        HPI    History of Present Illness    CHIEF COMPLAINT:  Gayatri presents today for annual check up    RECENT MEDICAL HISTORY:  She has had multiple recent medical visits including a virtual visit in May, OBGYN visit with Dr. Massey in June, psychiatric consultation with Dr. Williamson, urgent care visit for left hip pain, and orthopedic appointment on May 30th for left low back pain. X-rays of low back showed mild age-related arthritis.    GOUT:  She experiences intermittent gout flares, particularly triggered by inadequate hydration. During acute episodes, she experiences throbbing and severe pain, even at rest. She actively avoids trigger foods and understands the importance of managing inflammation during flares. While expressing reluctance to take medication, she acknowledges its necessity during significant pain episodes. She takes colchicine and indomethacin as needed for symptom management, though currently has difficulty obtaining colchicine prescription. She attempts to manage her condition through dietary modifications and hydration.    SLEEP:  She reports significant sleep disturbances with interrupted sleep, consistently waking at 2-3am and frequently unable to return to sleep. She describes her sleep as neither deep nor restful. Working from home, she takes variable duration daytime naps. She has an irregular sleep schedule, sometimes sleeping only four hours at a time (e.g., 6-10pm). She expresses desire to establish a more consistent bedtime routine and reduce electronic use before sleep, currently using music or audiobooks while working.    DIET AND EXERCISE:  Her diet is currently in transition. She has reduced overall food intake and is attempting to incorporate more fruits and vegetables. She currently snacks on chips and is trying to modify this habit. She has  switched from sugary drinks to water. She reports no physical activity.    WEIGHT MANAGEMENT:  Her current weight is 224 lbs, increased from previous weight of 218 lbs. She has set a goal weight of below 200 lbs.    CURRENT MEDICATIONS:  She takes fish oil and vitamin C supplements, along with colchicine and indomethacin as needed for gout management.      ROS:  General: -fever, -chills, -fatigue, -weight gain, -weight loss, +difficulty staying asleep, +sleep disturbances  Eyes: -vision changes, -redness, -discharge  ENT: -ear pain, -nasal congestion, -sore throat  Cardiovascular: -chest pain, -palpitations, -lower extremity edema  Respiratory: -cough, -shortness of breath  Gastrointestinal: -abdominal pain, -nausea, -vomiting, -diarrhea, -constipation, -blood in stool  Genitourinary: -dysuria, -hematuria, -frequency  Musculoskeletal: -joint pain, -muscle pain, +back pain  Skin: -rash, -lesion  Neurological: -headache, -dizziness, -numbness, -tingling  Psychiatric: -anxiety, -depression, -sleep difficulty             Negative 10 point ROS outside of HPI    Social History     Socioeconomic History    Marital status: Single   Tobacco Use    Smoking status: Never     Passive exposure: Never    Smokeless tobacco: Never   Substance and Sexual Activity    Alcohol use: Yes     Comment: socially    Drug use: Never    Sexual activity: Not Currently     Social Drivers of Health     Financial Resource Strain: Low Risk  (5/21/2025)    Overall Financial Resource Strain (CARDIA)     Difficulty of Paying Living Expenses: Not very hard   Food Insecurity: Food Insecurity Present (5/21/2025)    Hunger Vital Sign     Worried About Running Out of Food in the Last Year: Sometimes true     Ran Out of Food in the Last Year: Sometimes true   Transportation Needs: No Transportation Needs (5/21/2025)    PRAPARE - Transportation     Lack of Transportation (Medical): No     Lack of Transportation (Non-Medical): No   Physical Activity:  Insufficiently Active (5/21/2025)    Exercise Vital Sign     Days of Exercise per Week: 2 days     Minutes of Exercise per Session: 20 min   Stress: Stress Concern Present (5/21/2025)    [employer] - Occupational Stress Questionnaire     Feeling of Stress : To some extent   Housing Stability: High Risk (5/21/2025)    Housing Stability Vital Sign     Unable to Pay for Housing in the Last Year: Yes     Number of Times Moved in the Last Year: 4     Homeless in the Last Year: No         Current Medications[1]      Physical Exam  Vitals:    06/27/25 1048   BP: 115/77   Pulse: 82   Temp: 98.4 °F (36.9 °C)       Physical Exam      Gen: well appearing, NAD  Resp: non labored breathing, no crackles, no wheezes, CTAB  CV: RRR no murmur, gallops, rubs, no LE edema  Abd: soft nontender BS present no organomegaly      Problem List Items Addressed This Visit       Class 2 severe obesity due to excess calories with serious comorbidity and body mass index (BMI) of 38.0 to 38.9 in adult    Mixed dyslipidemia - Primary     Other Visit Diagnoses         Diabetes mellitus screening          Encounter for screening mammogram for malignant neoplasm of breast          Routine health maintenance                1. Mixed dyslipidemia  - Lipid Panel; Future    2. Diabetes mellitus screening  - Hemoglobin A1C; Future    3. Encounter for screening mammogram for malignant neoplasm of breast  - Mammo Digital Screening Bilat w/ Rafi (XPD); Future    4. Routine health maintenance  - CBC Auto Differential; Future  - Comprehensive Metabolic Panel; Future  - TSH; Future    5. Class 2 severe obesity due to excess calories with serious comorbidity and body mass index (BMI) of 38.0 to 38.9 in adult  Discussed changing lifestyle including getting at least 30 min. of moderate intensity exercise 3-4 week. Limit processed/packaged foods. Increasing water intake and having a normal sleep schedule of at least 7 hours per  night.        RTC in one year or sooner as needed    Sarah Green MD  Family Medicine           [1]   Current Outpatient Medications:     ascorbic acid, vitamin C, (VITAMIN C) 500 MG tablet, Take 2 tablets (1,000 mg total) by mouth 2 (two) times daily., Disp: 120 tablet, Rfl: 11    colchicine (MITIGARE) 0.6 mg Cap, Day 1: 1.2 mg at the first sign of flare, followed by 0.6 mg after 1 hour. Day 2:  0.6 mg once or twice daily until flare resolves, Disp: 45 capsule, Rfl: 1    indomethacin (INDOCIN) 50 MG capsule, Take 1 capsule (50 mg total) by mouth 3 (three) times daily with meals., Disp: 60 capsule, Rfl: 0    loratadine (CLARITIN) 10 mg tablet, Take 1 tablet (10 mg total) by mouth once daily., Disp: 30 tablet, Rfl: 0    omega 3-dha-epa-fish oil (FISH OIL) 1,200 (144-216) mg Cap, Take 1 capsule by mouth 2 (two) times a day., Disp: 60 capsule, Rfl: 11